# Patient Record
Sex: FEMALE | Race: WHITE | NOT HISPANIC OR LATINO | Employment: FULL TIME | RURAL
[De-identification: names, ages, dates, MRNs, and addresses within clinical notes are randomized per-mention and may not be internally consistent; named-entity substitution may affect disease eponyms.]

---

## 2020-01-22 ENCOUNTER — OFFICE VISIT (OUTPATIENT)
Dept: FAMILY MEDICINE CLINIC | Facility: CLINIC | Age: 55
End: 2020-01-22

## 2020-01-22 VITALS
OXYGEN SATURATION: 98 % | RESPIRATION RATE: 16 BRPM | DIASTOLIC BLOOD PRESSURE: 70 MMHG | WEIGHT: 220.8 LBS | BODY MASS INDEX: 39.12 KG/M2 | TEMPERATURE: 98.4 F | SYSTOLIC BLOOD PRESSURE: 128 MMHG | HEIGHT: 63 IN | HEART RATE: 100 BPM

## 2020-01-22 DIAGNOSIS — Z12.31 ENCOUNTER FOR SCREENING MAMMOGRAM FOR MALIGNANT NEOPLASM OF BREAST: ICD-10-CM

## 2020-01-22 DIAGNOSIS — R53.82 CHRONIC FATIGUE: Primary | ICD-10-CM

## 2020-01-22 DIAGNOSIS — Z13.1 SCREENING FOR DIABETES MELLITUS: ICD-10-CM

## 2020-01-22 DIAGNOSIS — Z13.220 SCREENING, LIPID: ICD-10-CM

## 2020-01-22 DIAGNOSIS — E66.01 MORBID OBESITY (HCC): ICD-10-CM

## 2020-01-22 DIAGNOSIS — Z12.11 SCREEN FOR COLON CANCER: ICD-10-CM

## 2020-01-22 PROCEDURE — 99213 OFFICE O/P EST LOW 20 MIN: CPT | Performed by: FAMILY MEDICINE

## 2020-01-22 RX ORDER — MULTIPLE VITAMINS W/ MINERALS TAB 9MG-400MCG
1 TAB ORAL DAILY
COMMUNITY
End: 2022-04-11

## 2020-01-22 RX ORDER — CHLORAL HYDRATE 500 MG
CAPSULE ORAL
COMMUNITY

## 2020-01-22 RX ORDER — LANOLIN ALCOHOL/MO/W.PET/CERES
1000 CREAM (GRAM) TOPICAL DAILY
COMMUNITY
End: 2020-07-22

## 2020-01-22 RX ORDER — ACETAMINOPHEN,DIPHENHYDRAMINE HCL 500; 25 MG/1; MG/1
1 TABLET, FILM COATED ORAL NIGHTLY PRN
COMMUNITY

## 2020-01-22 NOTE — PROGRESS NOTES
Chief Complaint   Patient presents with   • Fatigue       Subjective   Trish Daley is a 54 y.o. female.     Fatigue   This is a recurrent problem. The current episode started more than 1 year ago. The problem occurs every several days. The problem has been waxing and waning. Associated symptoms include arthralgias and fatigue. Pertinent negatives include no abdominal pain, chest pain, chills, coughing, fever, joint swelling, myalgias, nausea, neck pain, rash, swollen glands, vomiting or weakness. Nothing aggravates the symptoms. Treatments tried: Vit B12, beet juice. The treatment provided no relief.     She denies GISELLE or recent change in job that would worsen her symptoms.     I have reviewed and updated her medications, medical history and problem list during today's office visit.       Past Medical History :  Active Ambulatory Problems     Diagnosis Date Noted   • No Active Ambulatory Problems     Resolved Ambulatory Problems     Diagnosis Date Noted   • No Resolved Ambulatory Problems     Past Medical History:   Diagnosis Date   • Asthma        Medication List:    Current Outpatient Medications:   •  albuterol sulfate  (90 Base) MCG/ACT inhaler, Inhale 2 puffs., Disp: , Rfl:   •  diphenhydrAMINE-acetaminophen (TYLENOL PM)  MG tablet per tablet, Take 1 tablet by mouth At Night As Needed for Sleep., Disp: , Rfl:   •  Multiple Vitamins-Minerals (MULTIVITAMIN WITH MINERALS) tablet tablet, Take 1 tablet by mouth Daily., Disp: , Rfl:   •  Omega-3 Fatty Acids (FISH OIL) 1000 MG capsule capsule, Take  by mouth Daily With Breakfast., Disp: , Rfl:   •  vitamin B-12 (CYANOCOBALAMIN) 1000 MCG tablet, Take 1,000 mcg by mouth Daily., Disp: , Rfl:   •  Vitamin D, Cholecalciferol, (CHOLECALCIFEROL) 10 MCG (400 UNIT) tablet, Take 400 Units by mouth Daily., Disp: , Rfl:       Social History     Tobacco Use   • Smoking status: Never Smoker   • Smokeless tobacco: Never Used   Substance Use Topics   • Alcohol use:  Yes     Frequency: 2-4 times a month     Family History   Problem Relation Age of Onset   • Cancer Father    • Other Father    • Hypertension Father    • COPD Father    • Other Sister    • Hypertension Daughter      Past Surgical History:   Procedure Laterality Date   • FRACTURE SURGERY  2004    left wrist   • HERNIA REPAIR     • HYSTERECTOMY  2001         Review of Systems   Constitutional: Positive for fatigue and unexpected weight gain. Negative for activity change, appetite change, chills, fever and unexpected weight loss.   HENT: Negative for swollen glands and trouble swallowing.    Eyes: Negative for blurred vision, double vision, redness and visual disturbance.   Respiratory: Negative for apnea, cough, shortness of breath and wheezing.    Cardiovascular: Negative for chest pain, palpitations and leg swelling.   Gastrointestinal: Negative for abdominal pain, blood in stool, constipation, diarrhea, nausea and vomiting.   Endocrine: Negative for cold intolerance, heat intolerance, polydipsia and polyuria.   Genitourinary: Positive for amenorrhea. Negative for dysuria and pelvic pain.   Musculoskeletal: Positive for arthralgias. Negative for gait problem, joint swelling, myalgias, neck pain and neck stiffness.   Skin: Negative for rash and skin lesions.   Allergic/Immunologic: Negative for immunocompromised state.   Neurological: Negative for dizziness, tremors, seizures, syncope, facial asymmetry, weakness and headache.   Hematological: Negative for adenopathy. Does not bruise/bleed easily.   Psychiatric/Behavioral: Negative for sleep disturbance, depressed mood and stress.       I have reviewed and confirmed the accuracy of the ROS as documented by the MA/LPN/RN Eleanor Rocha MD      Objective   Vitals:    01/22/20 1516   BP: 128/70   BP Location: Right arm   Patient Position: Sitting   Cuff Size: Large Adult   Pulse: 100   Resp: 16   Temp: 98.4 °F (36.9 °C)   TempSrc: Oral   SpO2: 98%   Weight:  "100 kg (220 lb 12.8 oz)   Height: 160 cm (63\")     Body mass index is 39.11 kg/m².    Physical Exam   Constitutional: She is oriented to person, place, and time. She appears well-developed and well-nourished. No distress.   HENT:   Head: Normocephalic and atraumatic.   Right Ear: External ear normal. Tympanic membrane is not erythematous.   Left Ear: External ear normal. Tympanic membrane is not erythematous.   Mouth/Throat: Oropharynx is clear and moist. No oropharyngeal exudate.   Eyes: Pupils are equal, round, and reactive to light. Conjunctivae and EOM are normal. Right eye exhibits no discharge. Left eye exhibits no discharge. No scleral icterus.   Neck: Normal range of motion. Neck supple. No JVD present. No edema present. No thyromegaly present.   Cardiovascular: Normal rate, regular rhythm and normal heart sounds.   No murmur heard.  Pulmonary/Chest: Effort normal and breath sounds normal. She has no wheezes. She has no rales.   Abdominal: Soft. There is no tenderness. There is no rebound and no guarding.   Musculoskeletal: She exhibits no edema or tenderness.   Lymphadenopathy:     She has no cervical adenopathy.   Neurological: She is alert and oriented to person, place, and time. No cranial nerve deficit. She exhibits normal muscle tone. Coordination normal.   Skin: Skin is warm. Capillary refill takes less than 2 seconds. No rash noted.   Psychiatric: She has a normal mood and affect. Her behavior is normal. Judgment and thought content normal.           Assessment/Plan     Diagnoses and all orders for this visit:    1. Chronic fatigue (Primary)  -     Comprehensive Metabolic Panel  -     TSH  -     CBC & Differential  -     Vitamin D 25 hydroxy  -     Vitamin B12    2. Encounter for screening mammogram for malignant neoplasm of breast  -     Mammo Screening Digital Tomosynthesis Bilateral With CAD    3. Screen for colon cancer    4. Screening for diabetes mellitus  -     Hemoglobin A1c    5. Screening, " lipid  -     Lipid Panel    Check labs, update preventative screening.  She declines flu shot.   Monitor for signs/symptoms of GISELLE.  F/U PRN.  I recommended return to the office at her convenience for a physical exam with pelvic exam (pap smear no longer needed).    No follow-ups on file.

## 2020-01-28 ENCOUNTER — HOSPITAL ENCOUNTER (OUTPATIENT)
Dept: MAMMOGRAPHY | Facility: HOSPITAL | Age: 55
Discharge: HOME | End: 2020-01-28
Admitting: FAMILY MEDICINE

## 2020-01-28 PROCEDURE — 77063 BREAST TOMOSYNTHESIS BI: CPT

## 2020-01-28 PROCEDURE — 77067 SCR MAMMO BI INCL CAD: CPT

## 2020-02-04 DIAGNOSIS — R92.8 ABNORMALITY OF RIGHT BREAST ON SCREENING MAMMOGRAM: Primary | ICD-10-CM

## 2020-02-05 ENCOUNTER — TELEPHONE (OUTPATIENT)
Dept: FAMILY MEDICINE CLINIC | Facility: CLINIC | Age: 55
End: 2020-02-05

## 2020-02-05 NOTE — TELEPHONE ENCOUNTER
Called Trish Daley regarding results pt. verbalized understanding for results.        appt made at [facility] for Feb 13 at 3pm patient told 230

## 2020-02-05 NOTE — TELEPHONE ENCOUNTER
----- Message from Eleanor Rocha MD sent at 2/4/2020  3:49 PM EST -----  Please let patient know that her mammogram results are back and there is a spot in the right breast that needs some extra pictures taken.  We will do this with more mammogram images and possibly an ultrasound.  I have ordered the test for [facility].  If she has any other previous mammograms, we may need to try help radiology track the images down.  Where has she had previous mammograms done?

## 2020-02-13 ENCOUNTER — HOSPITAL ENCOUNTER (OUTPATIENT)
Dept: ULTRASOUND IMAGING | Facility: HOSPITAL | Age: 55
Discharge: HOME | End: 2020-02-13

## 2020-02-13 ENCOUNTER — HOSPITAL ENCOUNTER (OUTPATIENT)
Dept: MAMMOGRAPHY | Facility: HOSPITAL | Age: 55
Discharge: HOME | End: 2020-02-13
Admitting: FAMILY MEDICINE

## 2020-02-13 DIAGNOSIS — R92.8 ABNORMALITY OF RIGHT BREAST ON SCREENING MAMMOGRAM: ICD-10-CM

## 2020-02-13 PROCEDURE — G0279 TOMOSYNTHESIS, MAMMO: HCPCS

## 2020-02-13 PROCEDURE — 77065 DX MAMMO INCL CAD UNI: CPT

## 2020-02-13 PROCEDURE — 76642 ULTRASOUND BREAST LIMITED: CPT

## 2020-02-24 ENCOUNTER — HOSPITAL ENCOUNTER (OUTPATIENT)
Dept: ULTRASOUND IMAGING | Facility: HOSPITAL | Age: 55
Discharge: HOME | End: 2020-02-24
Admitting: FAMILY MEDICINE

## 2020-02-24 DIAGNOSIS — N63.10 BREAST MASS, RIGHT: ICD-10-CM

## 2020-02-26 ENCOUNTER — TELEPHONE (OUTPATIENT)
Dept: FAMILY MEDICINE CLINIC | Facility: CLINIC | Age: 55
End: 2020-02-26

## 2020-03-02 LAB
25(OH)D3+25(OH)D2 SERPL-MCNC: 32.4 NG/ML (ref 30–100)
ALBUMIN SERPL-MCNC: 4.2 G/DL (ref 3.8–4.9)
ALBUMIN/GLOB SERPL: 1.8 {RATIO} (ref 1.2–2.2)
ALP SERPL-CCNC: 67 IU/L (ref 39–117)
ALT SERPL-CCNC: 13 IU/L (ref 0–32)
AST SERPL-CCNC: 14 IU/L (ref 0–40)
BASOPHILS # BLD AUTO: 0.1 X10E3/UL (ref 0–0.2)
BASOPHILS NFR BLD AUTO: 1 %
BILIRUB SERPL-MCNC: 0.2 MG/DL (ref 0–1.2)
BUN SERPL-MCNC: 11 MG/DL (ref 6–24)
BUN/CREAT SERPL: 17 (ref 9–23)
CALCIUM SERPL-MCNC: 9.5 MG/DL (ref 8.7–10.2)
CHLORIDE SERPL-SCNC: 100 MMOL/L (ref 96–106)
CHOLEST SERPL-MCNC: 211 MG/DL (ref 100–199)
CO2 SERPL-SCNC: 26 MMOL/L (ref 20–29)
CREAT SERPL-MCNC: 0.63 MG/DL (ref 0.57–1)
EOSINOPHIL # BLD AUTO: 0.1 X10E3/UL (ref 0–0.4)
EOSINOPHIL NFR BLD AUTO: 1 %
ERYTHROCYTE [DISTWIDTH] IN BLOOD BY AUTOMATED COUNT: 13.1 % (ref 11.7–15.4)
GLOBULIN SER CALC-MCNC: 2.4 G/DL (ref 1.5–4.5)
GLUCOSE SERPL-MCNC: 91 MG/DL (ref 65–99)
HBA1C MFR BLD: 5.7 % (ref 4.8–5.6)
HCT VFR BLD AUTO: 41.4 % (ref 34–46.6)
HDLC SERPL-MCNC: 60 MG/DL
HGB BLD-MCNC: 13.7 G/DL (ref 11.1–15.9)
IMM GRANULOCYTES # BLD AUTO: 0 X10E3/UL (ref 0–0.1)
IMM GRANULOCYTES NFR BLD AUTO: 0 %
LDLC SERPL CALC-MCNC: 94 MG/DL (ref 0–99)
LYMPHOCYTES # BLD AUTO: 2.6 X10E3/UL (ref 0.7–3.1)
LYMPHOCYTES NFR BLD AUTO: 30 %
MCH RBC QN AUTO: 29.5 PG (ref 26.6–33)
MCHC RBC AUTO-ENTMCNC: 33.1 G/DL (ref 31.5–35.7)
MCV RBC AUTO: 89 FL (ref 79–97)
MONOCYTES # BLD AUTO: 0.5 X10E3/UL (ref 0.1–0.9)
MONOCYTES NFR BLD AUTO: 5 %
NEUTROPHILS # BLD AUTO: 5.4 X10E3/UL (ref 1.4–7)
NEUTROPHILS NFR BLD AUTO: 63 %
PLATELET # BLD AUTO: 352 X10E3/UL (ref 150–450)
POTASSIUM SERPL-SCNC: 4.7 MMOL/L (ref 3.5–5.2)
PROT SERPL-MCNC: 6.6 G/DL (ref 6–8.5)
RBC # BLD AUTO: 4.64 X10E6/UL (ref 3.77–5.28)
SODIUM SERPL-SCNC: 141 MMOL/L (ref 134–144)
TRIGL SERPL-MCNC: 285 MG/DL (ref 0–149)
TSH SERPL DL<=0.005 MIU/L-ACNC: 2.19 UIU/ML (ref 0.45–4.5)
VIT B12 SERPL-MCNC: 837 PG/ML (ref 232–1245)
VLDLC SERPL CALC-MCNC: 57 MG/DL (ref 5–40)
WBC # BLD AUTO: 8.7 X10E3/UL (ref 3.4–10.8)

## 2020-03-11 NOTE — PATIENT INSTRUCTIONS
Prediabetes Eating Plan  Prediabetes is a condition that causes blood sugar (glucose) levels to be higher than normal. This increases the risk for developing diabetes. In order to prevent diabetes from developing, your health care provider may recommend a diet and other lifestyle changes to help you:  · Control your blood glucose levels.  · Improve your cholesterol levels.  · Manage your blood pressure.  Your health care provider may recommend working with a diet and nutrition specialist (dietitian) to make a meal plan that is best for you.  What are tips for following this plan?  Lifestyle  · Set weight loss goals with the help of your health care team. It is recommended that most people with prediabetes lose 7% of their current body weight.  · Exercise for at least 30 minutes at least 5 days a week.  · Attend a support group or seek ongoing support from a mental health counselor.  · Take over-the-counter and prescription medicines only as told by your health care provider.  Reading food labels  · Read food labels to check the amount of fat, salt (sodium), and sugar in prepackaged foods. Avoid foods that have:  ? Saturated fats.  ? Trans fats.  ? Added sugars.  · Avoid foods that have more than 300 milligrams (mg) of sodium per serving. Limit your daily sodium intake to less than 2,300 mg each day.  Shopping  · Avoid buying pre-made and processed foods.  Cooking  · Cook with olive oil. Do not use butter, lard, or ghee.  · Bake, broil, grill, or boil foods. Avoid frying.  Meal planning    · Work with your dietitian to develop an eating plan that is right for you. This may include:  ? Tracking how many calories you take in. Use a food diary, notebook, or mobile application to track what you eat at each meal.  ? Using the glycemic index (GI) to plan your meals. The index tells you how quickly a food will raise your blood glucose. Choose low-GI foods. These foods take a longer time to raise blood glucose.  · Consider  following a Mediterranean diet. This diet includes:  ? Several servings each day of fresh fruits and vegetables.  ? Eating fish at least twice a week.  ? Several servings each day of whole grains, beans, nuts, and seeds.  ? Using olive oil instead of other fats.  ? Moderate alcohol consumption.  ? Eating small amounts of red meat and whole-fat dairy.  · If you have high blood pressure, you may need to limit your sodium intake or follow a diet such as the DASH eating plan. DASH is an eating plan that aims to lower high blood pressure.  What foods are recommended?  The items listed below may not be a complete list. Talk with your dietitian about what dietary choices are best for you.  Grains  Whole grains, such as whole-wheat or whole-grain breads, crackers, cereals, and pasta. Unsweetened oatmeal. Bulgur. Barley. Quinoa. Brown rice. Corn or whole-wheat flour tortillas or taco shells.  Vegetables  Lettuce. Spinach. Peas. Beets. Cauliflower. Cabbage. Broccoli. Carrots. Tomatoes. Squash. Eggplant. Herbs. Peppers. Onions. Cucumbers. [place] sprouts.  Fruits  Berries. Bananas. Apples. Oranges. Grapes. Papaya. [place]. Pomegranate. Kiwi. Grapefruit. Cherries.  Meats and other protein foods  Seafood. Poultry without skin. Lean cuts of pork and beef. Tofu. Eggs. Nuts. Beans.  Dairy  Low-fat or fat-free dairy products, such as yogurt, cottage cheese, and cheese.  Beverages  Water. Tea. Coffee. Sugar-free or diet soda. [place] water. Lowfat or no-fat milk. Milk alternatives, such as soy or almond milk.  Fats and oils  Olive oil. Canola oil. Sunflower oil. Grapeseed oil. Avocado. Walnuts.  Sweets and desserts  Sugar-free or low-fat pudding. Sugar-free or low-fat ice cream and other frozen treats.  Seasoning and other foods  Herbs. Sodium-free spices. Mustard. Relish. Low-fat, low-sugar ketchup. Low-fat, low-sugar barbecue sauce. Low-fat or fat-free mayonnaise.  What foods are not recommended?  The items listed below may not be a  complete list. Talk with your dietitian about what dietary choices are best for you.  Grains  Refined white flour and flour products, such as bread, pasta, snack foods, and cereals.  Vegetables  Canned vegetables. Frozen vegetables with butter or cream sauce.  Fruits  Fruits canned with syrup.  Meats and other protein foods  Fatty cuts of meat. Poultry with skin. Breaded or fried meat. Processed meats.  Dairy  Full-fat yogurt, cheese, or milk.  Beverages  Sweetened drinks, such as sweet iced tea and soda.  Fats and oils  Butter. Lard. Ghee.  Sweets and desserts  Baked goods, such as cake, cupcakes, pastries, cookies, and cheesecake.  Seasoning and other foods  Spice mixes with added salt. Ketchup. Barbecue sauce. Mayonnaise.  Summary  · To prevent diabetes from developing, you may need to make diet and other lifestyle changes to help control blood sugar, improve cholesterol levels, and manage your blood pressure.  · Set weight loss goals with the help of your health care team. It is recommended that most people with prediabetes lose 7 percent of their current body weight.  · Consider following a Mediterranean diet that includes plenty of fresh fruits and vegetables, whole grains, beans, nuts, seeds, fish, lean meat, low-fat dairy, and healthy oils.  This information is not intended to replace advice given to you by your health care provider. Make sure you discuss any questions you have with your health care provider.  Document Released: 05/03/2016 Document Revised: 02/21/2018 Document Reviewed: 02/21/2018  [employer] Patient Education © 2020 [employer]

## 2020-07-22 ENCOUNTER — TELEPHONE (OUTPATIENT)
Dept: FAMILY MEDICINE CLINIC | Facility: CLINIC | Age: 55
End: 2020-07-22

## 2020-07-22 ENCOUNTER — OFFICE VISIT (OUTPATIENT)
Dept: FAMILY MEDICINE CLINIC | Facility: CLINIC | Age: 55
End: 2020-07-22

## 2020-07-22 VITALS
DIASTOLIC BLOOD PRESSURE: 64 MMHG | WEIGHT: 211.2 LBS | OXYGEN SATURATION: 96 % | RESPIRATION RATE: 18 BRPM | SYSTOLIC BLOOD PRESSURE: 128 MMHG | HEIGHT: 63 IN | TEMPERATURE: 98.9 F | BODY MASS INDEX: 37.42 KG/M2 | HEART RATE: 104 BPM

## 2020-07-22 DIAGNOSIS — L23.7 ALLERGIC CONTACT DERMATITIS DUE TO PLANTS, EXCEPT FOOD: Primary | ICD-10-CM

## 2020-07-22 PROCEDURE — 99213 OFFICE O/P EST LOW 20 MIN: CPT | Performed by: FAMILY MEDICINE

## 2020-07-22 PROCEDURE — 96372 THER/PROPH/DIAG INJ SC/IM: CPT | Performed by: FAMILY MEDICINE

## 2020-07-22 RX ORDER — METHYLPREDNISOLONE ACETATE 80 MG/ML
80 INJECTION, SUSPENSION INTRA-ARTICULAR; INTRALESIONAL; INTRAMUSCULAR; SOFT TISSUE ONCE
Status: COMPLETED | OUTPATIENT
Start: 2020-07-22 | End: 2020-07-22

## 2020-07-22 RX ORDER — DEXAMETHASONE SODIUM PHOSPHATE 10 MG/ML
10 INJECTION INTRAMUSCULAR; INTRAVENOUS ONCE
Status: COMPLETED | OUTPATIENT
Start: 2020-07-22 | End: 2020-07-22

## 2020-07-22 RX ORDER — METHYLPREDNISOLONE 4 MG/1
TABLET ORAL
Qty: 21 TABLET | Refills: 0 | Status: SHIPPED | OUTPATIENT
Start: 2020-07-22 | End: 2022-04-11

## 2020-07-22 RX ADMIN — DEXAMETHASONE SODIUM PHOSPHATE 10 MG: 10 INJECTION INTRAMUSCULAR; INTRAVENOUS at 17:42

## 2020-07-22 RX ADMIN — METHYLPREDNISOLONE ACETATE 80 MG: 80 INJECTION, SUSPENSION INTRA-ARTICULAR; INTRALESIONAL; INTRAMUSCULAR; SOFT TISSUE at 17:44

## 2020-07-22 NOTE — PROGRESS NOTES
Chief Complaint   Patient presents with   • Rash       Subjective   Trish Daley is a 55 y.o. female.     Rash   This is a new problem. The current episode started yesterday. The problem has been gradually worsening since onset. The affected locations include the face, neck, left ear, left arm, right arm and chest. The rash is characterized by blistering, redness, pain, burning and itchiness. She was exposed to plant contact. Pertinent negatives include no cough, diarrhea, eye pain, fatigue, fever, shortness of breath, sore throat or vomiting. Past treatments include antihistamine and topical steroids. The treatment provided mild relief. Her past medical history is significant for asthma.      I have reviewed and updated her medications, medical history and problem list during today's office visit.       Past Medical History :  Active Ambulatory Problems     Diagnosis Date Noted   • No Active Ambulatory Problems     Resolved Ambulatory Problems     Diagnosis Date Noted   • No Resolved Ambulatory Problems     Past Medical History:   Diagnosis Date   • Asthma        Medication List:    Current Outpatient Medications:   •  albuterol sulfate  (90 Base) MCG/ACT inhaler, Inhale 2 puffs., Disp: , Rfl:   •  diphenhydrAMINE-acetaminophen (TYLENOL PM)  MG tablet per tablet, Take 1 tablet by mouth At Night As Needed for Sleep., Disp: , Rfl:   •  Multiple Vitamins-Minerals (MULTIVITAMIN WITH MINERALS) tablet tablet, Take 1 tablet by mouth Daily., Disp: , Rfl:   •  Omega-3 Fatty Acids (FISH OIL) 1000 MG capsule capsule, Take  by mouth Daily With Breakfast., Disp: , Rfl:   •  Vitamin D, Cholecalciferol, (CHOLECALCIFEROL) 10 MCG (400 UNIT) tablet, Take 400 Units by mouth Daily., Disp: , Rfl:       Allergies   Allergen Reactions   • Cantaloupe (Diagnostic) Other (See Comments)     Ear inflamation   • Codeine Arrhythmia   • Cucumber Extract Other (See Comments)     Cucumbers earache   • Oxycodone-Acetaminophen  "Arrhythmia   • Pepper Other (See Comments)     Bell peppers/inflamation of ears and bowel system   • Watermelon [Citrullus Vulgaris] Other (See Comments)     earache       Social History     Tobacco Use   • Smoking status: Never Smoker   • Smokeless tobacco: Never Used   Substance Use Topics   • Alcohol use: Yes     Frequency: 2-4 times a month       Review of Systems   Constitutional: Negative for appetite change, chills, fatigue and fever.   HENT: Negative for sore throat.    Eyes: Negative for pain, itching and visual disturbance.   Respiratory: Negative for cough, shortness of breath and wheezing.    Cardiovascular: Negative for chest pain, palpitations and leg swelling.   Gastrointestinal: Negative for abdominal pain, diarrhea and vomiting.   Endocrine: Negative for polydipsia and polyuria.   Genitourinary: Negative for decreased urine volume.   Musculoskeletal: Negative for myalgias.   Skin: Positive for rash. Negative for bruise.   Neurological: Negative for dizziness and syncope.   Hematological: Negative for adenopathy. Does not bruise/bleed easily.       I have reviewed and confirmed the accuracy of the ROS as documented by the MA/LPN/RN Eleanor Rocha MD      Objective   Vitals:    07/22/20 1713   BP: 128/64   BP Location: Right arm   Patient Position: Sitting   Cuff Size: Large Adult   Pulse: 104   Resp: 18   Temp: 98.9 °F (37.2 °C)   TempSrc: Oral   SpO2: 96%   Weight: 95.8 kg (211 lb 3.2 oz)   Height: 160 cm (63\")     Body mass index is 37.41 kg/m².    Physical Exam   Constitutional: She is oriented to person, place, and time. She appears well-developed and well-nourished. No distress.   HENT:   Head: Normocephalic and atraumatic.   Mouth/Throat: Oropharynx is clear and moist.   Eyes: Pupils are equal, round, and reactive to light. Conjunctivae and EOM are normal.   Neck: Normal range of motion. Neck supple. No edema present.   Cardiovascular: Normal rate, regular rhythm and normal heart " sounds.   Pulmonary/Chest: Effort normal and breath sounds normal.   Abdominal: Soft.   Musculoskeletal: She exhibits no edema.   Neurological: She is alert and oriented to person, place, and time. No cranial nerve deficit.   Skin: Skin is warm. Capillary refill takes less than 2 seconds. Rash noted. Rash is macular and papular. There is erythema.   Psychiatric: She has a normal mood and affect. Her behavior is normal. Thought content normal.         Assessment/Plan     Diagnoses and all orders for this visit:    1. Allergic contact dermatitis due to plants, except food (Primary)  -     methylPREDNISolone (MEDROL, JENNIFER,) 4 MG tablet; Take as directed on package instructions.  Dispense: 21 tablet; Refill: 0  -     methylPREDNISolone acetate (DEPO-medrol) injection 80 mg  -     dexamethasone (DECADRON) injection 10 mg    IM steroids in office.  Can start oral steroid pack if rash starts to rebound.    Return if symptoms worsen or fail to improve.     I wore protective equipment throughout this patient encounter to include mask. Hand hygiene was performed before donning protective equipment and after removal when leaving the room.

## 2020-07-22 NOTE — TELEPHONE ENCOUNTER
She has poison ivy and benadryl is not helping. Can you send in something to [facility] on [address] in [place]?

## 2020-09-09 ENCOUNTER — TELEPHONE (OUTPATIENT)
Dept: FAMILY MEDICINE CLINIC | Facility: CLINIC | Age: 55
End: 2020-09-09

## 2020-09-09 NOTE — TELEPHONE ENCOUNTER
----- Message from Eleanor Rocha MD sent at 8/3/2020  4:19 PM EDT -----  Regarding: FW: Repeat mammogram  Will you see if she is ready to set these up?  If so, she will need a diagnostic right  mammogram and right breast ultrasound limited (if needed).  Thanks.    ----- Message -----  From: Eleanor Rocha MD  Sent: 8/3/2020  To: Eleanor Rocha MD  Subject: Repeat mammogram                                 Repeat right breast diagnostic mammogram

## 2021-06-28 ENCOUNTER — TELEPHONE (OUTPATIENT)
Dept: FAMILY MEDICINE CLINIC | Facility: CLINIC | Age: 56
End: 2021-06-28

## 2021-06-28 NOTE — TELEPHONE ENCOUNTER
Caller: Trish Daley    Relationship: Self    Best call back number: [phone]    What medication are you requesting: Z-PAC    What are your current symptoms: RUNNING NOSE, STUFFY NOSE, SORE THROAT, COUGHING    How long have you been experiencing symptoms: WEEK AND A HALF    Have you had these symptoms before:    [x] Yes  [] No    Have you been treated for these symptoms before:   [x] Yes  [] No    If a prescription is needed, what is your preferred pharmacy and phone number:      [id number] - [address] [facility] [address] [facility] [address] [facility] [address] - [phone] [facility] [phone]      Additional notes:

## 2021-06-28 NOTE — TELEPHONE ENCOUNTER
Will need appt.    We have no openings left on our schedule today, so either Wed or other provider.

## 2022-04-11 ENCOUNTER — HOSPITAL ENCOUNTER (OUTPATIENT)
Dept: GENERAL RADIOLOGY | Facility: HOSPITAL | Age: 57
Discharge: HOME | End: 2022-04-11
Admitting: FAMILY MEDICINE

## 2022-04-11 ENCOUNTER — OFFICE VISIT (OUTPATIENT)
Dept: FAMILY MEDICINE CLINIC | Facility: CLINIC | Age: 57
End: 2022-04-11

## 2022-04-11 VITALS
BODY MASS INDEX: 38.14 KG/M2 | HEART RATE: 86 BPM | WEIGHT: 215.25 LBS | HEIGHT: 63 IN | TEMPERATURE: 98.2 F | SYSTOLIC BLOOD PRESSURE: 126 MMHG | RESPIRATION RATE: 18 BRPM | DIASTOLIC BLOOD PRESSURE: 78 MMHG | OXYGEN SATURATION: 99 %

## 2022-04-11 DIAGNOSIS — Z12.11 COLON CANCER SCREENING: ICD-10-CM

## 2022-04-11 DIAGNOSIS — Z13.1 SCREENING FOR DIABETES MELLITUS: ICD-10-CM

## 2022-04-11 DIAGNOSIS — E66.9 OBESITY, CLASS II, BMI 35-39.9: ICD-10-CM

## 2022-04-11 DIAGNOSIS — R92.8 ABNORMALITY OF RIGHT BREAST ON SCREENING MAMMOGRAM: ICD-10-CM

## 2022-04-11 DIAGNOSIS — M25.461 KNEE EFFUSION, RIGHT: ICD-10-CM

## 2022-04-11 DIAGNOSIS — M25.561 ACUTE PAIN OF RIGHT KNEE: Primary | ICD-10-CM

## 2022-04-11 DIAGNOSIS — Z13.220 SCREENING, LIPID: ICD-10-CM

## 2022-04-11 DIAGNOSIS — Z13.0 SCREENING, IRON DEFICIENCY ANEMIA: ICD-10-CM

## 2022-04-11 PROCEDURE — 99214 OFFICE O/P EST MOD 30 MIN: CPT | Performed by: FAMILY MEDICINE

## 2022-04-11 PROCEDURE — 73564 X-RAY EXAM KNEE 4 OR MORE: CPT

## 2022-04-11 RX ORDER — PREDNISONE 20 MG/1
TABLET ORAL
Qty: 11 TABLET | Refills: 0 | Status: SHIPPED | OUTPATIENT
Start: 2022-04-11 | End: 2022-11-04

## 2022-04-11 NOTE — PROGRESS NOTES
Chief Complaint   Patient presents with   • Leg Pain       Subjective   Trish Daley is a 56 y.o. female.     Leg Pain   The incident occurred more than 1 week ago. There was no injury mechanism. The pain is present in the right leg and right foot. The pain is at a severity of 6/10. The pain is moderate. The pain has been fluctuating since onset. Associated symptoms include numbness (when knee swells) and tingling. Pertinent negatives include no inability to bear weight. She reports no foreign bodies present. Exacerbated by: bending - has been driving a lot and the seat hitting the back of the knee worsens symptoms. She has tried rest, non-weight bearing, immobilization, elevation, heat, ice, acetaminophen and NSAIDs (Ibuprofen) for the symptoms. The treatment provided mild relief.      Previous knee imaging (2015) showed mild degenerative changes.    I have reviewed relevant past medical, family, social and surgical history for this patient.  Medications review is done by myself, with patient.  Last visit in the office 7/2020.      Past Medical History :  Active Ambulatory Problems     Diagnosis Date Noted   • No Active Ambulatory Problems     Resolved Ambulatory Problems     Diagnosis Date Noted   • No Resolved Ambulatory Problems     Past Medical History:   Diagnosis Date   • Asthma        Medication List:    Current Outpatient Medications:   •  albuterol sulfate  (90 Base) MCG/ACT inhaler, Inhale 2 puffs., Disp: , Rfl:   •  diphenhydrAMINE-acetaminophen (TYLENOL PM)  MG tablet per tablet, Take 1 tablet by mouth At Night As Needed for Sleep., Disp: , Rfl:   •  Omega-3 Fatty Acids (FISH OIL) 1000 MG capsule capsule, Take  by mouth Daily With Breakfast., Disp: , Rfl:   •  Vitamin D, Cholecalciferol, (CHOLECALCIFEROL) 10 MCG (400 UNIT) tablet, Take 400 Units by mouth Daily., Disp: , Rfl:       Allergies   Allergen Reactions   • Cantaloupe (Diagnostic) Other (See Comments)     Ear inflamation   •  "Codeine Arrhythmia   • Cucumber Extract Other (See Comments)     Cucumbers earache   • Oxycodone-Acetaminophen Arrhythmia   • Pepper Other (See Comments)     Bell peppers/inflamation of ears and bowel system   • Watermelon [Citrullus Vulgaris] Other (See Comments)     earache       Social History     Tobacco Use   • Smoking status: Never Smoker   • Smokeless tobacco: Never Used   Substance Use Topics   • Alcohol use: Yes       Review of Systems   Constitutional: Negative for fever.   Respiratory: Negative for shortness of breath.    Cardiovascular: Negative for leg swelling (swelling limited to knee).   Musculoskeletal: Positive for gait problem and joint swelling.   Skin: Negative for color change and bruise.   Neurological: Positive for tingling and numbness (when knee swells). Negative for weakness.         Objective   Vitals:    04/11/22 1536   BP: 126/78   BP Location: Right arm   Patient Position: Sitting   Cuff Size: Adult   Pulse: 86   Resp: 18   Temp: 98.2 °F (36.8 °C)   TempSrc: Temporal   SpO2: 99%   Weight: 97.6 kg (215 lb 4 oz)   Height: 160 cm (63\")     Body mass index is 38.13 kg/m².    Physical Exam  Constitutional:       General: She is not in acute distress.     Appearance: Normal appearance. She is well-developed. She is obese.   HENT:      Head: Normocephalic and atraumatic.   Eyes:      General: No scleral icterus.        Right eye: No discharge.         Left eye: No discharge.      Extraocular Movements: Extraocular movements intact.      Conjunctiva/sclera: Conjunctivae normal.   Cardiovascular:      Rate and Rhythm: Normal rate and regular rhythm.      Heart sounds: Normal heart sounds. No murmur heard.  Pulmonary:      Effort: Pulmonary effort is normal.      Breath sounds: Normal breath sounds.   Musculoskeletal:         General: No deformity.      Cervical back: Normal range of motion and neck supple.      Right knee: Swelling, effusion, bony tenderness (medial tibia) and crepitus present. " Tenderness present over the medial joint line. No ACL laxity or PCL laxity.      Instability Tests: Anterior drawer test negative. Posterior drawer test negative.      Right lower leg: Bony tenderness present. No swelling, lacerations or tenderness. No edema.      Left lower leg: No edema.        Legs:    Skin:     General: Skin is warm.      Capillary Refill: Capillary refill takes less than 2 seconds.      Findings: No rash.   Neurological:      General: No focal deficit present.      Mental Status: She is alert.   Psychiatric:         Mood and Affect: Mood normal.         Behavior: Behavior normal.         Thought Content: Thought content normal.           Assessment/Plan     Diagnoses and all orders for this visit:    1. Acute pain of right knee (Primary)  -     XR Knee Bilateral AP Standing  -     XR Knee 4+ View Right  -     predniSONE (DELTASONE) 20 MG tablet; TID x 3 days, BID x 3 days, QD x 3 days, 1/2 tab daily x 4 days  Dispense: 11 tablet; Refill: 0    2. Knee effusion, right  -     XR Knee Bilateral AP Standing  -     XR Knee 4+ View Right  -     predniSONE (DELTASONE) 20 MG tablet; TID x 3 days, BID x 3 days, QD x 3 days, 1/2 tab daily x 4 days  Dispense: 11 tablet; Refill: 0    3. Colon cancer screening  -     Ambulatory Referral For Screening Colonoscopy    4. Abnormality of right breast on screening mammogram  -     Mammo Diagnostic Digital Tomosynthesis Bilateral With CAD  -     US Breast Right Limited    5. Screening, lipid  -     Lipid Panel    6. Screening for diabetes mellitus  -     Hemoglobin A1c  -     Comprehensive Metabolic Panel    7. Screening, iron deficiency anemia  -     CBC & Differential    8. Obesity, Class II, BMI 35-39.9    Short course of oral steroids.  Continue ibuprofen, ice.  Recommended steroid joint injection - she can consider and return for injection if interested.    Green packet given.  Mammogram (to f/u on previously abnormal mammogram right breast) and u/s ordered.   Fasting labs ordered.      Return if symptoms worsen or fail to improve.       Patient was given instructions and counseling regarding his/her condition or for health maintenance advice. Please see specific information pulled into the AVS if appropriate.     I wore protective equipment throughout this patient encounter to include mask. Hand hygiene was performed before donning protective equipment and after removal when leaving the room.

## 2022-04-14 ENCOUNTER — TELEPHONE (OUTPATIENT)
Dept: FAMILY MEDICINE CLINIC | Facility: CLINIC | Age: 57
End: 2022-04-14

## 2022-04-14 NOTE — TELEPHONE ENCOUNTER
----- Message from Eleanor Rocha MD sent at 4/14/2022  7:36 AM EDT -----  Please let patient know that her knee xray is showing moderate arthritis in the knee with significant narrowing (degeneration of the cushioning) on the inner side of the knee.  She also has similar changes in the left knee.  Continue with treatment p  kyle as discussed at her visit.  Recommend joint injection if she does not improve, or referral if she desires.

## 2022-04-14 NOTE — PROGRESS NOTES
Please let patient know that her knee xray is showing moderate arthritis in the knee with significant narrowing (degeneration of the cushioning) on the inner side of the knee.  She also has similar changes in the left knee.  Continue with treatment plan as discussed at her visit.  Recommend joint injection if she does not improve, or referral if she desires.

## 2022-04-27 ENCOUNTER — HOSPITAL ENCOUNTER (OUTPATIENT)
Dept: ULTRASOUND IMAGING | Facility: HOSPITAL | Age: 57
Discharge: HOME | End: 2022-04-27

## 2022-04-27 ENCOUNTER — HOSPITAL ENCOUNTER (OUTPATIENT)
Dept: MAMMOGRAPHY | Facility: HOSPITAL | Age: 57
Discharge: HOME | End: 2022-04-27

## 2022-04-27 PROCEDURE — G0279 TOMOSYNTHESIS, MAMMO: HCPCS

## 2022-04-27 PROCEDURE — 77066 DX MAMMO INCL CAD BI: CPT

## 2022-11-04 ENCOUNTER — OFFICE VISIT (OUTPATIENT)
Dept: FAMILY MEDICINE CLINIC | Facility: CLINIC | Age: 57
End: 2022-11-04

## 2022-11-04 VITALS
OXYGEN SATURATION: 95 % | TEMPERATURE: 98 F | SYSTOLIC BLOOD PRESSURE: 126 MMHG | HEIGHT: 63 IN | DIASTOLIC BLOOD PRESSURE: 70 MMHG | BODY MASS INDEX: 37.21 KG/M2 | HEART RATE: 74 BPM | WEIGHT: 210 LBS | RESPIRATION RATE: 18 BRPM

## 2022-11-04 DIAGNOSIS — R79.89 LOW VITAMIN D LEVEL: ICD-10-CM

## 2022-11-04 DIAGNOSIS — R89.9 ABNORMAL LABORATORY TEST: ICD-10-CM

## 2022-11-04 DIAGNOSIS — E66.09 CLASS 2 OBESITY DUE TO EXCESS CALORIES WITHOUT SERIOUS COMORBIDITY WITH BODY MASS INDEX (BMI) OF 37.0 TO 37.9 IN ADULT: Primary | ICD-10-CM

## 2022-11-04 DIAGNOSIS — F41.9 ANXIETY AND DEPRESSION: ICD-10-CM

## 2022-11-04 DIAGNOSIS — Z11.59 ENCOUNTER FOR HEPATITIS C SCREENING TEST FOR LOW RISK PATIENT: ICD-10-CM

## 2022-11-04 DIAGNOSIS — F32.A ANXIETY AND DEPRESSION: ICD-10-CM

## 2022-11-04 DIAGNOSIS — Z12.11 COLON CANCER SCREENING: ICD-10-CM

## 2022-11-04 PROBLEM — E66.812 CLASS 2 OBESITY DUE TO EXCESS CALORIES WITHOUT SERIOUS COMORBIDITY WITH BODY MASS INDEX (BMI) OF 37.0 TO 37.9 IN ADULT: Status: ACTIVE | Noted: 2022-11-04

## 2022-11-04 PROCEDURE — 99214 OFFICE O/P EST MOD 30 MIN: CPT | Performed by: STUDENT IN AN ORGANIZED HEALTH CARE EDUCATION/TRAINING PROGRAM

## 2022-11-04 RX ORDER — HYDROXYZINE HYDROCHLORIDE 10 MG/1
10 TABLET, FILM COATED ORAL 3 TIMES DAILY PRN
Qty: 90 TABLET | Refills: 1 | Status: SHIPPED | OUTPATIENT
Start: 2022-11-04 | End: 2023-02-03

## 2022-11-04 RX ORDER — ESCITALOPRAM OXALATE 5 MG/1
5 TABLET ORAL DAILY
Qty: 60 TABLET | Refills: 0 | Status: SHIPPED | OUTPATIENT
Start: 2022-11-04 | End: 2022-12-09

## 2022-11-04 NOTE — PROGRESS NOTES
"Subjective   Trish Daley is a 57 y.o. female.   Chief Complaint   Patient presents with   • Establish Care     Pt transferring from Dr. Eleanor Rocha   • Stress       History of Present Illness     Anxiety/Depression  -Started 1 year ago.  quit his job and started working at a company pt works for ( is stressful), company is growing so fast it's putting a lot of pressure on her ( is quitting due to pressure too).  takes stress out on wife in the sense he can't let it go and pt feels she can't get away from work. Too much work and not enough people  - denies marital issues  - when going through her first divorce years ago was put on xanax but she felt \"stupid and foggy\"    - pt is more prone to side effects    The following portions of the patient's history were reviewed and updated as appropriate: allergies, current medications, past family history, past medical history, past social history, past surgical history and problem list.    Patient Active Problem List   Diagnosis   • Class 2 obesity due to excess calories without serious comorbidity with body mass index (BMI) of 37.0 to 37.9 in adult       Current Outpatient Medications on File Prior to Visit   Medication Sig Dispense Refill   • diphenhydrAMINE-acetaminophen (TYLENOL PM)  MG tablet per tablet Take 1 tablet by mouth At Night As Needed for Sleep.     • Vitamin D, Cholecalciferol, (CHOLECALCIFEROL) 10 MCG (400 UNIT) tablet Take 400 Units by mouth Daily.     • albuterol sulfate  (90 Base) MCG/ACT inhaler Inhale 2 puffs.     • Omega-3 Fatty Acids (FISH OIL) 1000 MG capsule capsule Take  by mouth Daily With Breakfast.     • [DISCONTINUED] predniSONE (DELTASONE) 20 MG tablet TID x 3 days, BID x 3 days, QD x 3 days, 1/2 tab daily x 4 days 11 tablet 0     No current facility-administered medications on file prior to visit.     Current outpatient and discharge medications have been reconciled for the " "patient.  Reviewed by: Niki Collins DO      Allergies   Allergen Reactions   • Cantaloupe (Diagnostic) Other (See Comments)     Ear inflamation   • Codeine Arrhythmia   • Cucumber Extract Other (See Comments)     Cucumbers earache   • Oxycodone-Acetaminophen Arrhythmia   • Pepper Other (See Comments)     Bell peppers/inflamation of ears and bowel system   • Watermelon [Citrullus Vulgaris] Other (See Comments)     earache         Objective   Visit Vitals  /70 (BP Location: Right arm, Patient Position: Sitting, Cuff Size: Large Adult)   Pulse 74   Temp 98 °F (36.7 °C) (Skin)   Resp 18   Ht 160 cm (63\")   Wt 95.3 kg (210 lb)   SpO2 95%   BMI 37.20 kg/m²       Physical Exam  HENT:      Head: Normocephalic.   Eyes:      Conjunctiva/sclera: Conjunctivae normal.   Cardiovascular:      Rate and Rhythm: Normal rate and regular rhythm.      Heart sounds: Normal heart sounds.   Pulmonary:      Effort: Pulmonary effort is normal. No respiratory distress.      Breath sounds: Normal breath sounds. No wheezing, rhonchi or rales.   Neurological:      Mental Status: She is alert.           Diagnoses and all orders for this visit:    Anxiety and depression  -     escitalopram (LEXAPRO) 5 MG tablet; Take 1 tablet by mouth Daily.  Dispense: 60 tablet;.  Patient will take 1 daily for the first 7 days and then start taking 2 daily afterward  -     hydrOXYzine (ATARAX) 10 MG tablet; Take 1 tablet by mouth 3 (Three) Times a Day As Needed for Itching.  Dispense: 90 tablet; Refill: 1.  Did tell patient that this is for as needed use that she may take half of 1 or up to 5 at a time if she needs to.  Did tell her that it can make people drowsy, which is why I am giving her the lower dose  -Follow-up in 1 month     Class 2 obesity due to excess calories without serious comorbidity with body mass index (BMI) of 37.0 to 37.9 in adult (Primary)  Assessment & Plan:  Patient's (Body mass index is 37.2 kg/m².) indicates that they are obese " (BMI >30) with health conditions that include none . Weight is unchanged. BMI is is above average; BMI management plan is completed. We discussed portion control and increasing exercise.     Orders:  -     Lipid Panel With / Chol / HDL Ratio to monitor  -     Comprehensive Metabolic Panel to check LFTs for possible NAFLD    Encounter for hepatitis C screening test for low risk patient  -     Hepatitis C Antibody     Low vitamin D level  -     Vitamin D,25-Hydroxy monitor as patient takes a supplement    Abnormal laboratory test  -     CBC & Differential     Colon cancer screening  -     Ambulatory Referral For Screening Colonoscopy: Made for [facility]             Follow Up  - 1 month to follow up on anxiety  - 1-2 months for annual physical exam    Expected course, medications, and adverse effects discussed as appropriate.  Call or return if worsening or persistent symptoms.  I wore protective equipment throughout this patient encounter to include mask and eye protection. Hand hygiene was performed before donning protective equipment and after removal when leaving the room.    This document is intended for medical expert use only. Reading of this document by patients and/or patient's family without participating medical staff guidance may result in misinterpretation and unintended morbidity. Any interpretation of such data is the responsibility of the patient and/or family member responsible for the patient in concert with their primary or specialist providers, not to be left for sources of online searches such as [employer] or similar queries. Relying on these approaches to knowledge may result in misinterpretation, misguided goals of care and even death should patients or family members try recommendations outside of the realm of professional medical care.

## 2022-11-04 NOTE — ASSESSMENT & PLAN NOTE
Patient's (Body mass index is 37.2 kg/m².) indicates that they are obese (BMI >30) with health conditions that include none . Weight is unchanged. BMI is is above average; BMI management plan is completed. We discussed portion control and increasing exercise.

## 2022-11-12 LAB
25(OH)D3+25(OH)D2 SERPL-MCNC: 25.2 NG/ML (ref 30–100)
ALBUMIN SERPL-MCNC: 4.6 G/DL (ref 3.8–4.9)
ALBUMIN/GLOB SERPL: 2.3 {RATIO} (ref 1.2–2.2)
ALP SERPL-CCNC: 85 IU/L (ref 44–121)
ALT SERPL-CCNC: 12 IU/L (ref 0–32)
AST SERPL-CCNC: 12 IU/L (ref 0–40)
BASOPHILS # BLD AUTO: 0.1 X10E3/UL (ref 0–0.2)
BASOPHILS NFR BLD AUTO: 1 %
BILIRUB SERPL-MCNC: 0.2 MG/DL (ref 0–1.2)
BUN SERPL-MCNC: 14 MG/DL (ref 6–24)
BUN/CREAT SERPL: 22 (ref 9–23)
CALCIUM SERPL-MCNC: 9.7 MG/DL (ref 8.7–10.2)
CHLORIDE SERPL-SCNC: 103 MMOL/L (ref 96–106)
CHOLEST SERPL-MCNC: 235 MG/DL (ref 100–199)
CHOLEST/HDLC SERPL: 4.1 RATIO (ref 0–4.4)
CO2 SERPL-SCNC: 25 MMOL/L (ref 20–29)
CREAT SERPL-MCNC: 0.64 MG/DL (ref 0.57–1)
EGFRCR SERPLBLD CKD-EPI 2021: 103 ML/MIN/1.73
EOSINOPHIL # BLD AUTO: 0.1 X10E3/UL (ref 0–0.4)
EOSINOPHIL NFR BLD AUTO: 1 %
ERYTHROCYTE [DISTWIDTH] IN BLOOD BY AUTOMATED COUNT: 13.1 % (ref 11.7–15.4)
GLOBULIN SER CALC-MCNC: 2 G/DL (ref 1.5–4.5)
GLUCOSE SERPL-MCNC: 104 MG/DL (ref 70–99)
HCT VFR BLD AUTO: 41 % (ref 34–46.6)
HCV AB S/CO SERPL IA: <0.1 S/CO RATIO (ref 0–0.9)
HDLC SERPL-MCNC: 57 MG/DL
HGB BLD-MCNC: 13.6 G/DL (ref 11.1–15.9)
IMM GRANULOCYTES # BLD AUTO: 0 X10E3/UL (ref 0–0.1)
IMM GRANULOCYTES NFR BLD AUTO: 1 %
LDLC SERPL CALC-MCNC: 147 MG/DL (ref 0–99)
LYMPHOCYTES # BLD AUTO: 2.2 X10E3/UL (ref 0.7–3.1)
LYMPHOCYTES NFR BLD AUTO: 37 %
MCH RBC QN AUTO: 29.2 PG (ref 26.6–33)
MCHC RBC AUTO-ENTMCNC: 33.2 G/DL (ref 31.5–35.7)
MCV RBC AUTO: 88 FL (ref 79–97)
MONOCYTES # BLD AUTO: 0.3 X10E3/UL (ref 0.1–0.9)
MONOCYTES NFR BLD AUTO: 5 %
NEUTROPHILS # BLD AUTO: 3.3 X10E3/UL (ref 1.4–7)
NEUTROPHILS NFR BLD AUTO: 55 %
PLATELET # BLD AUTO: 374 X10E3/UL (ref 150–450)
POTASSIUM SERPL-SCNC: 4.6 MMOL/L (ref 3.5–5.2)
PROT SERPL-MCNC: 6.6 G/DL (ref 6–8.5)
RBC # BLD AUTO: 4.65 X10E6/UL (ref 3.77–5.28)
SODIUM SERPL-SCNC: 143 MMOL/L (ref 134–144)
TRIGL SERPL-MCNC: 173 MG/DL (ref 0–149)
VLDLC SERPL CALC-MCNC: 31 MG/DL (ref 5–40)
WBC # BLD AUTO: 5.9 X10E3/UL (ref 3.4–10.8)

## 2022-11-15 ENCOUNTER — TELEPHONE (OUTPATIENT)
Dept: FAMILY MEDICINE CLINIC | Facility: CLINIC | Age: 57
End: 2022-11-15

## 2022-11-15 NOTE — TELEPHONE ENCOUNTER
----- Message from Niki Collins DO sent at 11/15/2022  8:02 AM EST -----  Please let patient know that her hepatitis C screen was negative  Her cholesterol has increased from 2 years ago.  I calculated her risk for Heart attack and stroke in the next 10 years and is low enough that she does not need to be on a statin right now.  But increasing exercise and decreasing foods with high cholesterol in her diet would definitely be helpful  Her vitamin D is a little low at 25, taking about 1000 -1500 units of vitamin D daily would be helpful and we can recheck this in about 3 months  Her electrolytes, kidney function liver function are normal, her white and red cells are normal

## 2022-12-09 ENCOUNTER — OFFICE VISIT (OUTPATIENT)
Dept: FAMILY MEDICINE CLINIC | Facility: CLINIC | Age: 57
End: 2022-12-09

## 2022-12-09 VITALS
OXYGEN SATURATION: 98 % | HEART RATE: 81 BPM | BODY MASS INDEX: 37.21 KG/M2 | TEMPERATURE: 98.6 F | SYSTOLIC BLOOD PRESSURE: 130 MMHG | HEIGHT: 63 IN | RESPIRATION RATE: 16 BRPM | DIASTOLIC BLOOD PRESSURE: 70 MMHG | WEIGHT: 210 LBS

## 2022-12-09 DIAGNOSIS — F41.9 ANXIETY AND DEPRESSION: ICD-10-CM

## 2022-12-09 DIAGNOSIS — M25.562 CHRONIC PAIN OF BOTH KNEES: Primary | ICD-10-CM

## 2022-12-09 DIAGNOSIS — E66.09 CLASS 2 OBESITY DUE TO EXCESS CALORIES WITHOUT SERIOUS COMORBIDITY WITH BODY MASS INDEX (BMI) OF 37.0 TO 37.9 IN ADULT: ICD-10-CM

## 2022-12-09 DIAGNOSIS — M25.561 CHRONIC PAIN OF BOTH KNEES: Primary | ICD-10-CM

## 2022-12-09 DIAGNOSIS — F32.A ANXIETY AND DEPRESSION: ICD-10-CM

## 2022-12-09 DIAGNOSIS — G89.29 CHRONIC PAIN OF BOTH KNEES: Primary | ICD-10-CM

## 2022-12-09 PROCEDURE — 99214 OFFICE O/P EST MOD 30 MIN: CPT | Performed by: STUDENT IN AN ORGANIZED HEALTH CARE EDUCATION/TRAINING PROGRAM

## 2022-12-09 RX ORDER — ESCITALOPRAM OXALATE 20 MG/1
20 TABLET ORAL DAILY
Qty: 30 TABLET | Refills: 1 | Status: SHIPPED | OUTPATIENT
Start: 2022-12-09 | End: 2023-02-03 | Stop reason: ALTCHOICE

## 2022-12-09 NOTE — PROGRESS NOTES
Subjective   Trish Daley is a 57 y.o. female.   Chief Complaint   Patient presents with   • Knee Problem     Bilateral         History of Present Illness     Knee problem:  - Had issues with knees since she was 10 yrs old. Noticed pain really worsening about 2 years ago  - Bilateral knee right worse than left  -Symptoms:  Popping, weak, painful (sharp, stabbing)   -Treatment,  Ibuprofen Tylenol PM with some improvement  -X-rays done 04/2022: showed moderate osteoarthritis bilaterally  - did have PT for left knee when dog knocked her down. Billing for therapy was behind. Was an ortho clinic in [place]. Not interested in returning  - Lives 35-40 minutes from this office but does work in [place] and knows where [facility] is    Anxiety  - started escitalopram 10mg, takes off the edge but hasn't noticed much difference        The following portions of the patient's history were reviewed and updated as appropriate: allergies, current medications, past family history, past medical history, past social history, past surgical history and problem list.    Patient Active Problem List   Diagnosis   • Class 2 obesity due to excess calories without serious comorbidity with body mass index (BMI) of 37.0 to 37.9 in adult       Current Outpatient Medications on File Prior to Visit   Medication Sig Dispense Refill   • albuterol sulfate  (90 Base) MCG/ACT inhaler Inhale 2 puffs.     • diphenhydrAMINE-acetaminophen (TYLENOL PM)  MG tablet per tablet Take 1 tablet by mouth At Night As Needed for Sleep.     • Vitamin D, Cholecalciferol, (CHOLECALCIFEROL) 10 MCG (400 UNIT) tablet Take 400 Units by mouth Daily.     • hydrOXYzine (ATARAX) 10 MG tablet Take 1 tablet by mouth 3 (Three) Times a Day As Needed for Itching. 90 tablet 1   • Omega-3 Fatty Acids (FISH OIL) 1000 MG capsule capsule Take  by mouth Daily With Breakfast.       No current facility-administered medications on file prior to visit.  "    Current outpatient and discharge medications have been reconciled for the patient.  Reviewed by: Niki Collins DO      Allergies   Allergen Reactions   • Cantaloupe (Diagnostic) Other (See Comments)     Ear inflamation   • Codeine Arrhythmia   • Cucumber Extract Other (See Comments)     Cucumbers earache   • Oxycodone-Acetaminophen Arrhythmia   • Pepper Other (See Comments)     Bell peppers/inflamation of ears and bowel system   • Watermelon [Citrullus Vulgaris] Other (See Comments)     earache         Objective   Visit Vitals  /70 (BP Location: Right arm, Patient Position: Sitting, Cuff Size: Large Adult)   Pulse 81   Temp 98.6 °F (37 °C) (Skin)   Resp 16   Ht 160 cm (63\")   Wt 95.3 kg (210 lb)   SpO2 98%   BMI 37.20 kg/m²       Physical Exam  HENT:      Head: Normocephalic.   Eyes:      Conjunctiva/sclera: Conjunctivae normal.   Cardiovascular:      Rate and Rhythm: Normal rate and regular rhythm.      Heart sounds: Normal heart sounds.   Pulmonary:      Effort: Pulmonary effort is normal. No respiratory distress.      Breath sounds: Normal breath sounds. No wheezing, rhonchi or rales.   Neurological:      Mental Status: She is alert.           Diagnoses and all orders for this visit:    1. Chronic pain of both knees (Primary)  -     Ambulatory Referral to Physical Therapy Evaluate and treat for [facility] rehab    2. Anxiety and depression  -     escitalopram (Lexapro) increased from 10mg to  20 MG tablet; Take 1 tablet by mouth Daily.  Dispense: 30 tablet; Refill: 1    3. Class 2 obesity due to excess calories without serious comorbidity with body mass index (BMI) of 37.0 to 37.9 in adult  Assessment & Plan:  Patient's (Body mass index is 37.2 kg/m².) indicates that they are obese (BMI >30) with health conditions that include none . Weight is unchanged. BMI is is above average; BMI management plan is completed. We discussed portion control and increasing exercise.                Follow Up  - 1 " month for anxiety/depression and update on knees    Expected course, medications, and adverse effects discussed as appropriate.  Call or return if worsening or persistent symptoms.  I wore protective equipment throughout this patient encounter to include mask and eye protection. Hand hygiene was performed before donning protective equipment and after removal when leaving the room.    This document is intended for medical expert use only. Reading of this document by patients and/or patient's family without participating medical staff guidance may result in misinterpretation and unintended morbidity. Any interpretation of such data is the responsibility of the patient and/or family member responsible for the patient in concert with their primary or specialist providers, not to be left for sources of online searches such as [employer], [employer] or similar queries. Relying on these approaches to knowledge may result in misinterpretation, misguided goals of care and even death should patients or family members try recommendations outside of the realm of professional medical care.

## 2023-02-03 ENCOUNTER — OFFICE VISIT (OUTPATIENT)
Dept: FAMILY MEDICINE CLINIC | Facility: CLINIC | Age: 58
End: 2023-02-03
Payer: COMMERCIAL

## 2023-02-03 ENCOUNTER — TELEPHONE (OUTPATIENT)
Dept: FAMILY MEDICINE CLINIC | Facility: CLINIC | Age: 58
End: 2023-02-03

## 2023-02-03 VITALS
HEIGHT: 63 IN | TEMPERATURE: 97.3 F | HEART RATE: 79 BPM | SYSTOLIC BLOOD PRESSURE: 128 MMHG | OXYGEN SATURATION: 96 % | WEIGHT: 216 LBS | DIASTOLIC BLOOD PRESSURE: 70 MMHG | RESPIRATION RATE: 16 BRPM | BODY MASS INDEX: 38.27 KG/M2

## 2023-02-03 DIAGNOSIS — F32.A ANXIETY AND DEPRESSION: ICD-10-CM

## 2023-02-03 DIAGNOSIS — F41.9 ANXIETY AND DEPRESSION: ICD-10-CM

## 2023-02-03 DIAGNOSIS — E66.09 CLASS 2 OBESITY DUE TO EXCESS CALORIES WITHOUT SERIOUS COMORBIDITY WITH BODY MASS INDEX (BMI) OF 37.0 TO 37.9 IN ADULT: ICD-10-CM

## 2023-02-03 DIAGNOSIS — R73.03 PREDIABETES: ICD-10-CM

## 2023-02-03 DIAGNOSIS — Z00.00 ANNUAL PHYSICAL EXAM: Primary | ICD-10-CM

## 2023-02-03 PROBLEM — J45.20 MILD INTERMITTENT ASTHMA WITHOUT COMPLICATION: Status: ACTIVE | Noted: 2023-02-03

## 2023-02-03 LAB
EXPIRATION DATE: NORMAL
HBA1C MFR BLD: 5.6 %
Lab: NORMAL

## 2023-02-03 PROCEDURE — 83036 HEMOGLOBIN GLYCOSYLATED A1C: CPT | Performed by: STUDENT IN AN ORGANIZED HEALTH CARE EDUCATION/TRAINING PROGRAM

## 2023-02-03 PROCEDURE — 99213 OFFICE O/P EST LOW 20 MIN: CPT | Performed by: STUDENT IN AN ORGANIZED HEALTH CARE EDUCATION/TRAINING PROGRAM

## 2023-02-03 PROCEDURE — 99396 PREV VISIT EST AGE 40-64: CPT | Performed by: STUDENT IN AN ORGANIZED HEALTH CARE EDUCATION/TRAINING PROGRAM

## 2023-02-03 RX ORDER — SERTRALINE HYDROCHLORIDE 25 MG/1
25 TABLET, FILM COATED ORAL DAILY
Qty: 30 TABLET | Refills: 1 | Status: SHIPPED | OUTPATIENT
Start: 2023-02-03

## 2023-02-03 NOTE — PATIENT INSTRUCTIONS
- Work up to 150 minutes of aerobic, moderate intensity (you can talk but you can't sing) or 75 minutes of vigorous activity of dedicated exercise a week  - 2 days a week, include resistance/weight training    Light intensity   - Ex: casual walking, light housework, stretching  Moderate Intensity   - brisk walking, water aerobics, ballroom dancing   - breathing will be a little harder with a faster heartbeat  Vigorous Intensity   - jogging/running, aerobic dancing    What are the benefits of movement?    Moving your body has many benefits. It can:    ?Burn calories, which helps people control their weight  ?Help control blood sugar levels in people with diabetes  ?Lower blood pressure, especially in people with high blood pressure  ?Lower stress and help with depression and anxiety  ?Keep bones strong, so they don't get thin and break easily  ?Lower the chance of dying from heart disease    Adding even small amounts of physical activity to your daily routine can improve your health.    What are the main types of exercise?    There are 3 main types of exercise. They are:    ?Aerobic exercise - Aerobic exercise raises a person's heart rate. Examples of aerobic exercise are walking, running, dancing, riding a bike, or swimming.  ?Resistance training - Resistance training helps make your muscles stronger. People can do this type of exercise using weights, exercise bands, or weight machines. You can also do this type of exercise using your own body weight, as with push-ups, or by lifting items in your home, like jugs of water.   ?Stretching - Stretching exercises help your muscles and joints move more easily.    It's important to have all 3 types of exercise in your exercise program. That way, your body, muscles, and joints can be as healthy as possible.    Should I talk to my doctor or nurse before I start exercising?    If you have not exercised before or have not exercised in a long time, talk with your doctor or  nurse before you start a very active exercise program.  If you have heart disease or risk factors for heart disease (like high blood pressure or diabetes), your doctor or nurse might recommend that you have an exercise test before starting an exercise program.  When you start an exercise program, start slowly. For example, do the exercise at a slow pace or for a few minutes only. Over time, you can exercise faster and for longer periods of time.  What should I do when I exercise? -- Each time you exercise, you should:    ?Warm up - Warming up can help keep you from hurting your muscles when you exercise. To warm up, do a light aerobic exercise (such as walking slowly) or stretch for 5 to 10 minutes.  ?Work out - You should try to get a mix of aerobic exercise, resistance training, and stretching. During an aerobic workout, you can walk fast, swim, run, or use an exercise machine, for example. Other activities, like dancing or playing tennis, are also forms of aerobic exercise. You should also take time to stretch all of your joints, including your neck, shoulders, back, hips, and knees. At least 2 times a week, you can do resistance training exercises as part of your workout.  ?Cool down - Cooling down helps keep you from feeling dizzy after you exercise and helps prevent muscle cramps. To cool down, you can stretch or do a light aerobic exercise for 5 minutes.    Some people go to a gym or do group exercise classes. But you can exercise even without these things. Some exercises can be done even in a small space. You can also try online videos or smartphone apps to get ideas for different types of exercise.     How often should I exercise?     Doctors recommend that people exercise at least 30 minutes a day, on 5 or more days of the week.  If you can't exercise for 30 minutes straight, try to exercise for 10 minutes at a time, 3 or 4 times a day. Even exercising for shorter amounts of time is good for you,  especially if it means spending less time sitting.     When should I call my doctor or nurse? -- If you have any of the following symptoms when you exercise, stop exercising and call your doctor or nurse right away:  ?Pain or pressure in your chest, arms, throat, jaw, or back  ?Nausea or vomiting  ?Feeling like your heart is fluttering or racing very fast  ?Feeling dizzy or faint    What if I don't have time to exercise?     Many people have very busy lives and might not think that they have time to exercise. But it's important to try to find time to exercise, even if you are tired or work a lot. Exercise can increase your energy level, which can make you feel better and might even help you get more work done.  Even if it's hard to set aside a lot of time to exercise, you can still improve your health by moving your body more. There are many ways that you can be more active. For example, you can:    ?Take the stairs instead of the elevator  ?Park in a parking space that is farther away from the door  ?Take a longer route when you walk from one place to another    Spending a lot of time sitting still - for example, watching television or working on the computer - can be bad for your health. Try to get up and move around whenever you can. Even small amounts of movement, like taking short walks, doing household chores, or gardening, can help improve your health. Finding activities you enjoy, or doing them with other people, can help you add more movement into your daily life.    What else should I do when I exercise?     To exercise safely and avoid problems, it's important to:    ?Drink fluids during and after exercising (but avoid drinks with a lot of caffeine or sugar)  ?Avoid exercising outside if it is too hot or cold  ?Wear layers of clothes, so that you can take them off if you get too hot  ?Wear shoes that fit well and support your feet  ?Be aware of your surroundings if you exercise outside

## 2023-02-03 NOTE — ASSESSMENT & PLAN NOTE
Patient's (Body mass index is 38.26 kg/m².) indicates that they are obese (BMI >30) with health conditions that include none . Weight is unchanged. BMI  is above average; BMI management plan is completed. We discussed portion control and increasing exercise.

## 2023-02-03 NOTE — PROGRESS NOTES
Chief Complaint   Patient presents with   • Annual Exam     Subjective   Trish Daley is a 57 y.o. female here for her annual physical with me.   Trish is here for coordination of medical care, to discuss health maintenance, disease prevention as well as to followup on medical problems.     Annual physical exam  Patient's last PE was unknown.  Patient's medication list, medical history and allergy list were reviewed and updated  Activity level is minimal.   Exercises 2 per week.   Appetite is poor.   Feels fairly well with few complaints.   Energy level is poor.   Sleeps fairly well.   Patient's last colonoscopy was never. Pt was given green packet last visit.  Patient has not mailed in her green packet to get herself scheduled with gastroenterology of [facility] for colonoscopy as her  is starting a new job and insurance will kick in until March  Patient's last mammogram was 2022.   Patient's last pap smear was .  She is HPV unknown  Patient is doing routine self skin exam occasionally.   Patient is doing routine self-breast exams never.      Anxiety/depression  -Increased up from 10 mg Lexapro up to lexapro 20mg  - has had a few anxiety attacks. Had a bad week. Had friends' parents die,  changed job and dog   - feels like this dose takes the edge off her anxiety but she still experiences a lot of symptoms of her anxiety and depression    The following portions of the patient's history were reviewed and updated as appropriate: allergies, current medications, past family history, past medical history, past social history, past surgical history and problem list.      Past Medical History :  Active Ambulatory Problems     Diagnosis Date Noted   • Class 2 obesity due to excess calories without serious comorbidity with body mass index (BMI) of 37.0 to 37.9 in adult 2022   • Mild intermittent asthma without complication 2023     Resolved Ambulatory Problems     Diagnosis  Date Noted   • No Resolved Ambulatory Problems     No Additional Past Medical History       Medication List:    Current Outpatient Medications:   •  albuterol sulfate  (90 Base) MCG/ACT inhaler, Inhale 2 puffs., Disp: , Rfl:   •  diphenhydrAMINE-acetaminophen (TYLENOL PM)  MG tablet per tablet, Take 1 tablet by mouth At Night As Needed for Sleep., Disp: , Rfl:   •  Omega-3 Fatty Acids (FISH OIL) 1000 MG capsule capsule, Take  by mouth Daily With Breakfast., Disp: , Rfl:   •  Vitamin D, Cholecalciferol, (CHOLECALCIFEROL) 10 MCG (400 UNIT) tablet, Take 400 Units by mouth Daily., Disp: , Rfl:   •  sertraline (Zoloft) 25 MG tablet, Take 1 tablet by mouth Daily., Disp: 30 tablet, Rfl: 1    Allergies:  Allergies   Allergen Reactions   • Cantaloupe (Diagnostic) Other (See Comments)     Ear inflamation   • Codeine Arrhythmia   • Cucumber Extract Other (See Comments)     Cucumbers earache   • Oxycodone-Acetaminophen Arrhythmia   • Pepper Other (See Comments)     Bell peppers/inflamation of ears and bowel system   • Watermelon [Citrullus Vulgaris] Other (See Comments)     earache       Social History:  Social History     Socioeconomic History   • Marital status:    Tobacco Use   • Smoking status: Never     Passive exposure: Past (father smoked as pt was growing up)   • Smokeless tobacco: Never   Vaping Use   • Vaping Use: Never used   Substance and Sexual Activity   • Alcohol use: Yes     Comment: 1 glass of wine weekly   • Drug use: Never   • Sexual activity: Yes     Partners: Male     Comment: monogamous with        Family History:  Family History   Problem Relation Age of Onset   • Hypertension Mother    • COPD Mother    • Other Father         Diverticulitis   • Cancer Father    • Hypertension Father    • COPD Father    • Hypertension Sister    • Heart disease Sister    • Other Sister         scoliosis   • Hypertension Daughter    • Hypertension Son        Past Surgical History:  Past Surgical  "History:   Procedure Laterality Date   • FRACTURE SURGERY  2004    left wrist   • HERNIA REPAIR     • HYSTERECTOMY  2001       PHQ-2 Depression Screening  Little interest or pleasure in doing things?     Feeling down, depressed, or hopeless?     PHQ-2 Total Score       Health Maintenance   Topic Date Due   • COLORECTAL CANCER SCREENING  Never done   • COVID-19 Vaccine (1) Never done   • INFLUENZA VACCINE  03/31/2023 (Originally 8/1/2022)   • TDAP/TD VACCINES (1 - Tdap) 02/03/2024 (Originally 6/23/1984)   • ZOSTER VACCINE (1 of 2) 02/03/2024 (Originally 6/23/2015)   • ANNUAL PHYSICAL  02/03/2024   • MAMMOGRAM  04/27/2024   • HEPATITIS C SCREENING  Completed   • Pneumococcal Vaccine 0-64  Aged Out   • PAP SMEAR  Discontinued         There is no immunization history on file for this patient.          Physical Exam:  Vital Signs:  /70 (BP Location: Right arm, Patient Position: Sitting, Cuff Size: Large Adult)   Pulse 79   Temp 97.3 °F (36.3 °C) (Skin)   Resp 16   Ht 160 cm (63\")   Wt 98 kg (216 lb)   SpO2 96%   BMI 38.26 kg/m²     Physical Exam  Constitutional:       General: She is not in acute distress.     Appearance: Normal appearance. She is obese.   HENT:      Head: Normocephalic and atraumatic.      Right Ear: Tympanic membrane normal.      Left Ear: Tympanic membrane normal.      Nose: Nose normal.      Mouth/Throat:      Mouth: Mucous membranes are moist.      Pharynx: Oropharynx is clear. No posterior oropharyngeal erythema.   Eyes:      Extraocular Movements: Extraocular movements intact.      Conjunctiva/sclera: Conjunctivae normal.   Neck:      Comments: - Thyroid not enlarged  Cardiovascular:      Rate and Rhythm: Normal rate and regular rhythm.      Heart sounds: Normal heart sounds.   Pulmonary:      Effort: Pulmonary effort is normal. No respiratory distress.      Breath sounds: Normal breath sounds. No stridor. No wheezing, rhonchi or rales.   Abdominal:      General: Abdomen is flat. " Bowel sounds are normal.      Palpations: Abdomen is soft. There is no mass.      Tenderness: There is no abdominal tenderness. There is no guarding.   Musculoskeletal:         General: No swelling or deformity. Normal range of motion.      Cervical back: Normal range of motion and neck supple.   Skin:     General: Skin is warm and dry.      Capillary Refill: Capillary refill takes less than 2 seconds.      Coloration: Skin is not jaundiced.      Findings: No rash.   Neurological:      General: No focal deficit present.      Mental Status: She is alert and oriented to person, place, and time.      Cranial Nerves: No cranial nerve deficit.      Motor: No weakness.      Coordination: Coordination normal.      Gait: Gait normal.      Deep Tendon Reflexes: Reflexes normal.   Psychiatric:         Mood and Affect: Mood normal.         Behavior: Behavior normal.         Thought Content: Thought content normal.           Assessment and Plan:  Diagnoses and all orders for this visit:    1. Annual physical exam (Primary)  -Normal 57-year-old female exam  -Pertinent lab work is already been drawn.  Checking on prediabetes via POC A1c as below  -Discussed with patient to go ahead and mail in her green packet and schedule with gastroenterology but schedule out beyond March.  Patient agreeable    2. Prediabetes  -     POC Glycosylated Hemoglobin (Hb A1C): 5.6%  -Patient has fallen just underneath prediabetic range.  We will keep an eye on this annually    3. Anxiety and depression  -Patient has been on the maximum of Lexapro and still feeling reasonably strong symptoms of anxiety depression.  Switching to Zoloft  -   sertraline (Zoloft) 25 MG tablet; Take 1 tablet by mouth Daily.  Dispense: 30 tablet; Refill: 1  -Follow-up in 1 month    4. Class 2 obesity due to excess calories without serious comorbidity with body mass index (BMI) of 37.0 to 37.9 in adult  Assessment & Plan:  Patient's (Body mass index is 38.26 kg/m².) indicates  that they are obese (BMI >30) with health conditions that include none . Weight is unchanged. BMI  is above average; BMI management plan is completed. We discussed portion control and increasing exercise.       Follow-up  -1 month for anxiety and depression  -1 year for annual physical exam    Discussed screening for common diseases.  Discussed timing of cervical cancer screening with pap smear and HPV testing. Encouraged self-breast exams, clinical breast exams, and discussed timing of mammograms.  Recommend yearly eye exams. Also discussed monitoring of blood pressure, lipids, blood sugars.      Mammography ordered.  Colon cancer screening options discussed, including Colonoscopy, Cologuard and Hemoccult cards.  Occult blood testing negative in office today.  She was given additional information on the Cologuard testing and can contact the office if order desired.     I wore protective equipment throughout this patient encounter to include mask and eye protection. Hand hygiene was performed before donning protective equipment and after removal when leaving the room.

## 2023-02-20 ENCOUNTER — TELEPHONE (OUTPATIENT)
Dept: FAMILY MEDICINE CLINIC | Facility: CLINIC | Age: 58
End: 2023-02-20
Payer: COMMERCIAL

## 2023-02-20 DIAGNOSIS — R79.89 LOW VITAMIN D LEVEL: Primary | ICD-10-CM

## 2023-02-20 NOTE — TELEPHONE ENCOUNTER
Spoke to pt 02/20/2023, 10:11 am advised pt lab order has been placed to repeat Vitamin D level.  Pt states she is out of town will do it when she returns

## 2023-02-20 NOTE — TELEPHONE ENCOUNTER
Patient's vitamin D was low at 25 on 11/11/2022.  Recommend she start taking 1000 to 1500 international units of vitamin D daily.  Rechecking vitamin D

## 2023-04-21 ENCOUNTER — TELEPHONE (OUTPATIENT)
Dept: FAMILY MEDICINE CLINIC | Facility: CLINIC | Age: 58
End: 2023-04-21
Payer: COMMERCIAL

## 2023-05-25 LAB — 25(OH)D3+25(OH)D2 SERPL-MCNC: 50.8 NG/ML (ref 30–100)

## 2023-05-30 ENCOUNTER — TELEPHONE (OUTPATIENT)
Dept: FAMILY MEDICINE CLINIC | Facility: CLINIC | Age: 58
End: 2023-05-30

## 2023-05-30 NOTE — TELEPHONE ENCOUNTER
----- Message from Niki Collins DO sent at 5/27/2023  9:37 PM EDT -----  please let patient know her vitamin D is now comfortably in the normal range

## 2023-06-02 ENCOUNTER — HOSPITAL ENCOUNTER (OUTPATIENT)
Dept: GENERAL RADIOLOGY | Facility: HOSPITAL | Age: 58
Discharge: HOME | End: 2023-06-02

## 2023-06-02 ENCOUNTER — OFFICE VISIT (OUTPATIENT)
Dept: FAMILY MEDICINE CLINIC | Facility: CLINIC | Age: 58
End: 2023-06-02

## 2023-06-02 VITALS
RESPIRATION RATE: 18 BRPM | TEMPERATURE: 96 F | WEIGHT: 217.6 LBS | HEIGHT: 63 IN | HEART RATE: 82 BPM | SYSTOLIC BLOOD PRESSURE: 130 MMHG | BODY MASS INDEX: 38.55 KG/M2 | OXYGEN SATURATION: 96 % | DIASTOLIC BLOOD PRESSURE: 64 MMHG

## 2023-06-02 DIAGNOSIS — H92.03 ACUTE EAR PAIN, BILATERAL: ICD-10-CM

## 2023-06-02 DIAGNOSIS — M54.2 ACUTE NECK PAIN: Primary | ICD-10-CM

## 2023-06-02 DIAGNOSIS — E66.09 CLASS 2 OBESITY DUE TO EXCESS CALORIES WITHOUT SERIOUS COMORBIDITY WITH BODY MASS INDEX (BMI) OF 37.0 TO 37.9 IN ADULT: ICD-10-CM

## 2023-06-02 PROCEDURE — 72040 X-RAY EXAM NECK SPINE 2-3 VW: CPT

## 2023-06-02 NOTE — PROGRESS NOTES
"Subjective   Trish Daley is a 57 y.o. female.   Chief Complaint   Patient presents with   • Neck Pain   • Bilateral ear pain     Right worse than left       History of Present Illness       Bilateral Ear pain  -Started 1 month ago  -Symptoms: right worse than left, aching, popping, sharp upon turning head to left,  waxing and waning  -Treatment:  OTC ear drops with slight improvement  - has environmental allergies. Doesn't take anything other than mucinex prn for it.  She typically just \"muscle through it\"  -Has not noticed jaw pain    Neck Pain  -Started 1 month ago  -Pain is the worst in her neck muscles at the base of the skull around the occiput  -Pain is worse in the morning.  Her muscles are very tight and loosens up as the day goes on  -Patient has switched pillows (currently uses a MyPillow), has changed how her computer is set up to help with her head position  -She has tried ice (helps), TENS unit (no help), ibuprofen, heat (makes it worse)  -Patient's mother told her that she may have meningitis and wanted her to get checked out.  Patient herself is not worried about being sick but would like an x-ray of the neck    Preventative  -Patient scheduled to get her colonoscopy in July        The following portions of the patient's history were reviewed and updated as appropriate: allergies, current medications, past family history, past medical history, past social history, past surgical history and problem list.    Patient Active Problem List   Diagnosis   • Class 2 obesity due to excess calories without serious comorbidity with body mass index (BMI) of 37.0 to 37.9 in adult   • Mild intermittent asthma without complication       Current Outpatient Medications on File Prior to Visit   Medication Sig Dispense Refill   • albuterol sulfate  (90 Base) MCG/ACT inhaler Inhale 2 puffs.     • diphenhydrAMINE-acetaminophen (TYLENOL PM)  MG tablet per tablet Take 1 tablet by mouth At Night As Needed for " "Sleep.     • [facility] (Osteo Bi-Flex/5-Loxin Advanced) tablet      • vitamin D3 125 MCG (5000 UT) capsule capsule      • [DISCONTINUED] Omega-3 Fatty Acids (FISH OIL) 1000 MG capsule capsule Take  by mouth Daily With Breakfast.     • [DISCONTINUED] sertraline (Zoloft) 25 MG tablet Take 1 tablet by mouth Daily. 30 tablet 1   • [DISCONTINUED] Vitamin D, Cholecalciferol, (CHOLECALCIFEROL) 10 MCG (400 UNIT) tablet Take 400 Units by mouth Daily.       No current facility-administered medications on file prior to visit.     Current outpatient and discharge medications have been reconciled for the patient.  Reviewed by: Niki Collins DO      Allergies   Allergen Reactions   • Cantaloupe (Diagnostic) Other (See Comments)     Ear inflamation   • Codeine Arrhythmia   • Cucumber Extract Other (See Comments)     Cucumbers earache   • Oxycodone-Acetaminophen Arrhythmia   • Pepper Other (See Comments)     Bell peppers/inflamation of ears and bowel system   • Watermelon [Citrullus Vulgaris] Other (See Comments)     earache         Objective   Visit Vitals  /64 (BP Location: Right arm, Patient Position: Sitting, Cuff Size: Large Adult)   Pulse 82   Temp 96 °F (35.6 °C) (Skin)   Resp 18   Ht 160 cm (63\")   Wt 98.7 kg (217 lb 9.6 oz)   SpO2 96%   BMI 38.55 kg/m²       Physical Exam  HENT:      Head: Normocephalic and atraumatic.      Ears:      Comments: - Serous fluid behind bilateral tympanic membranes.  No exudate, no erythema and no bulging noted  Eyes:      Conjunctiva/sclera: Conjunctivae normal.   Musculoskeletal:      Comments: - AGR of neck  Showed diffuse somatic dysfunction of the neck musculature bilaterally.  Worst near the skull    -No pain with pressure to the masseter muscles bilaterally.  No pain or popping with opening the jaw   Neurological:      General: No focal deficit present.      Mental Status: She is alert and oriented to person, place, and time.   Psychiatric:         Mood and Affect: " Mood normal.         Behavior: Behavior normal.           Diagnoses and all orders for this visit:    1. Acute neck pain (Primary)  -     XR Spine Cervical 2 or 3 View to look for any bony abnormalities  -Did recommend that physical therapy would be appropriate considering that patient has done a lot of different conservative measures to help reduce her neck pain but has been unsuccessful.   -Patient politely declined but did tell patient it is on the table if she wants it    2. Acute ear pain, bilateral  -Discussed that allergies could be causing her to have ear pain (did see serous fluid behind both tympanic membranes via the physical exam which would support that)  -Recommended trying an over-the-counter allergy medication (fexofenadine/loratadine/cetirizine) in addition to Flonase to help reduce possible congestion and pressure that may be manifesting in her ear  -Told patient to try 1 over-the-counter medication for a few weeks and if that is not working, try a different 1 to see if she reacts better to that 1  -If she is not noticing any improvement, asked her to come back in      3. Class 2 obesity due to excess calories without serious comorbidity with body mass index (BMI) of 37.0 to 37.9 in adult  Assessment & Plan:  Patient's (Body mass index is 38.55 kg/m².) indicates that they are obese (BMI >30) with health conditions that include none . Weight is unchanged. BMI  is above average; BMI management plan is completed. We discussed portion control and increasing exercise.         Follow Up  -As needed    Expected course, medications, and adverse effects discussed as appropriate.  Call or return if worsening or persistent symptoms.   Hand hygiene was performed before donning protective equipment and after removal when leaving the room.    This document is intended for medical expert use only. Reading of this document by patients and/or patient's family without participating medical staff guidance may result  in misinterpretation and unintended morbidity. Any interpretation of such data is the responsibility of the patient and/or family member responsible for the patient in concert with their primary or specialist providers, not to be left for sources of online searches such as [employer], [employer] or similar queries. Relying on these approaches to knowledge may result in misinterpretation, misguided goals of care and even death should patients or family members try recommendations outside of the realm of professional medical care.

## 2023-06-02 NOTE — ASSESSMENT & PLAN NOTE
Patient's (Body mass index is 38.55 kg/m².) indicates that they are obese (BMI >30) with health conditions that include none . Weight is unchanged. BMI  is above average; BMI management plan is completed. We discussed portion control and increasing exercise.

## 2023-06-05 ENCOUNTER — TELEPHONE (OUTPATIENT)
Dept: FAMILY MEDICINE CLINIC | Facility: CLINIC | Age: 58
End: 2023-06-05
Payer: COMMERCIAL

## 2023-06-05 NOTE — TELEPHONE ENCOUNTER
----- Message from Niki Collins DO sent at 6/5/2023  7:48 AM EDT -----  Patient's neck x-ray showed no evidence of fracture.  It did show some mild to moderate spondylosis (abnormal wear on the cartilage between the vertebra which can shift the vertebrae forward or back a little) along with moderate degenerative changes (arthritis) in her neck.

## 2023-07-26 ENCOUNTER — ON CAMPUS - OUTPATIENT (OUTPATIENT)
Dept: URBAN - METROPOLITAN AREA HOSPITAL 2 | Facility: HOSPITAL | Age: 58
End: 2023-07-26

## 2023-07-26 VITALS
SYSTOLIC BLOOD PRESSURE: 120 MMHG | OXYGEN SATURATION: 100 % | SYSTOLIC BLOOD PRESSURE: 135 MMHG | DIASTOLIC BLOOD PRESSURE: 97 MMHG | RESPIRATION RATE: 18 BRPM | DIASTOLIC BLOOD PRESSURE: 83 MMHG | RESPIRATION RATE: 16 BRPM | SYSTOLIC BLOOD PRESSURE: 158 MMHG | OXYGEN SATURATION: 97 % | SYSTOLIC BLOOD PRESSURE: 116 MMHG | OXYGEN SATURATION: 98 % | SYSTOLIC BLOOD PRESSURE: 155 MMHG | HEART RATE: 81 BPM | DIASTOLIC BLOOD PRESSURE: 93 MMHG | OXYGEN SATURATION: 95 % | TEMPERATURE: 96.9 F | SYSTOLIC BLOOD PRESSURE: 166 MMHG | HEART RATE: 96 BPM | DIASTOLIC BLOOD PRESSURE: 66 MMHG | HEART RATE: 88 BPM | RESPIRATION RATE: 20 BRPM | HEART RATE: 84 BPM | DIASTOLIC BLOOD PRESSURE: 81 MMHG | DIASTOLIC BLOOD PRESSURE: 107 MMHG | RESPIRATION RATE: 19 BRPM | HEIGHT: 63 IN | OXYGEN SATURATION: 96 % | DIASTOLIC BLOOD PRESSURE: 71 MMHG | HEART RATE: 79 BPM | DIASTOLIC BLOOD PRESSURE: 65 MMHG | WEIGHT: 213 LBS | OXYGEN SATURATION: 94 % | SYSTOLIC BLOOD PRESSURE: 147 MMHG

## 2023-07-26 DIAGNOSIS — Z80.0 FAMILY HISTORY OF MALIGNANT NEOPLASM OF DIGESTIVE ORGANS: ICD-10-CM

## 2023-07-26 DIAGNOSIS — K57.30 DIVERTICULOSIS OF LARGE INTESTINE WITHOUT PERFORATION OR ABS: ICD-10-CM

## 2023-07-26 DIAGNOSIS — Z83.71 FAMILY HISTORY OF COLONIC POLYPS: ICD-10-CM

## 2023-07-26 DIAGNOSIS — Z12.11 ENCOUNTER FOR SCREENING FOR MALIGNANT NEOPLASM OF COLON: ICD-10-CM

## 2023-07-26 PROCEDURE — 45378 DIAGNOSTIC COLONOSCOPY: CPT | Mod: 33 | Performed by: INTERNAL MEDICINE

## 2023-10-09 ENCOUNTER — TELEPHONE (OUTPATIENT)
Dept: FAMILY MEDICINE CLINIC | Facility: CLINIC | Age: 58
End: 2023-10-09
Payer: COMMERCIAL

## 2023-10-09 NOTE — TELEPHONE ENCOUNTER
Spoke to pt 10/09/2023, appt made with Dr. Castle for 10/10/2023, 4:45 pm advised pt if pain or rash gets worse, seek medical attention at Urgent Care or ER

## 2023-10-09 NOTE — TELEPHONE ENCOUNTER
Caller: Trish Daley    Relationship: Self    Best call back number: [phone]    What medication are you requesting: POISON IVY MEDICATION    What are your current symptoms: POISON PILLO RASH    How long have you been experiencing symptoms: NOTICED IT 10/4/23    Have you had these symptoms before:    [] Yes  [x] No    Have you been treated for these symptoms before:   [] Yes  [x] No    If a prescription is needed, what is your preferred pharmacy and phone number: [id number] [address] [phone] [facility] [phone]      Additional notes:PATIENT IS REQUESTING IF SOME POISON IVY MEDIATION COULD BE CALLED IN AS SE STATED THE RASH KEEPS SPREADING.

## 2023-10-10 ENCOUNTER — OFFICE VISIT (OUTPATIENT)
Dept: FAMILY MEDICINE CLINIC | Facility: CLINIC | Age: 58
End: 2023-10-10
Payer: COMMERCIAL

## 2023-10-10 VITALS
OXYGEN SATURATION: 97 % | RESPIRATION RATE: 16 BRPM | HEIGHT: 63 IN | WEIGHT: 218.2 LBS | HEART RATE: 97 BPM | TEMPERATURE: 97.7 F | DIASTOLIC BLOOD PRESSURE: 96 MMHG | SYSTOLIC BLOOD PRESSURE: 138 MMHG | BODY MASS INDEX: 38.66 KG/M2

## 2023-10-10 DIAGNOSIS — E66.09 CLASS 2 OBESITY DUE TO EXCESS CALORIES WITHOUT SERIOUS COMORBIDITY WITH BODY MASS INDEX (BMI) OF 37.0 TO 37.9 IN ADULT: ICD-10-CM

## 2023-10-10 DIAGNOSIS — B37.2 CANDIDAL DERMATITIS: ICD-10-CM

## 2023-10-10 DIAGNOSIS — L23.7 ALLERGIC CONTACT DERMATITIS DUE TO PLANTS, EXCEPT FOOD: Primary | ICD-10-CM

## 2023-10-10 RX ORDER — KETOCONAZOLE 200 MG/1
200 TABLET ORAL DAILY
Qty: 14 TABLET | Refills: 0 | Status: SHIPPED | OUTPATIENT
Start: 2023-10-10

## 2023-10-10 RX ORDER — TRIAMCINOLONE ACETONIDE 1 MG/G
1 OINTMENT TOPICAL 2 TIMES DAILY
Qty: 80 G | Refills: 6 | Status: SHIPPED | OUTPATIENT
Start: 2023-10-10

## 2023-10-10 RX ORDER — CLOTRIMAZOLE AND BETAMETHASONE DIPROPIONATE 10; .64 MG/G; MG/G
1 CREAM TOPICAL 2 TIMES DAILY
Qty: 45 G | Refills: 12 | Status: SHIPPED | OUTPATIENT
Start: 2023-10-10

## 2023-10-10 NOTE — PROGRESS NOTES
"Chief Complaint  Rash    Subjective     CC  Problem List  Visit Diagnosis   Encounters  Notes  Medications  Labs  Result Review Imaging  Media    Trish Daley presents to [facility] for   History of Present Illness  Influenza immunization was not given due to patient refusal    Rash  This is a new problem. The current episode started 1 to 4 weeks ago (11 days). The problem has been waxing and waning since onset. The affected locations include the left arm, right arm, right hand, abdomen, left hand and neck. She was exposed to plant contact (Poison ivy). Pertinent negatives include no congestion, cough, diarrhea, fatigue, fever, rhinorrhea, shortness of breath, sore throat or vomiting. Treatments tried: Chalomine, Benadryl. The treatment provided mild relief.       Review of Systems   Constitutional:  Negative for fatigue and fever.   HENT:  Negative for congestion, rhinorrhea and sore throat.    Respiratory:  Negative for cough and shortness of breath.    Cardiovascular:  Negative for chest pain, palpitations and leg swelling.   Gastrointestinal:  Negative for abdominal pain, constipation, diarrhea, nausea and vomiting.   Skin:  Positive for rash (worse over ext surface of arms but also over face and beneath breasts.).   Hematological:  Negative for adenopathy. Does not bruise/bleed easily.        Objective   Vital Signs:   /96 (BP Location: Right arm, Patient Position: Sitting, Cuff Size: Adult)   Pulse 97   Temp 97.7 øF (36.5 øC) (Temporal)   Resp 16   Ht 160 cm (63\")   Wt 99 kg (218 lb 3.2 oz)   SpO2 97% Comment: room air  BMI 38.65 kg/mý     Physical Exam  Constitutional:       General: She is not in acute distress.     Appearance: She is obese.   Cardiovascular:      Rate and Rhythm: Normal rate and regular rhythm.      Heart sounds: No murmur heard.  Pulmonary:      Effort: Pulmonary effort is normal.      Breath sounds: Normal breath sounds. No " wheezing.   Musculoskeletal:      Cervical back: Neck supple.   Skin:     Findings: Rash (There is a maculopapular vesicular rash over the upper ext and face. There is a 2nd confluent rash under the breast consistent with candida.) present.   Neurological:      Mental Status: She is alert.        Result Review :Labs  Result Review  Imaging  Med Tab  Media                 Assessment and Plan CC Problem List  Visit Diagnosis  ROS  Review (Popup)  Health Maintenance  Quality  BestPractice  Medications  SmartSets  SnapShot Encounters  Media  Problem List Items Addressed This Visit          Unprioritized    Class 2 obesity due to excess calories without serious comorbidity with body mass index (BMI) of 37.0 to 37.9 in adult    Current Assessment & Plan     Patient's (Body mass index is 38.65 kg/mý.) indicates that they are obese (BMI >30) with health conditions that include none . Weight is unchanged. BMI  is above average; BMI management plan is completed. We discussed portion control and increasing exercise.           Other Visit Diagnoses       Allergic contact dermatitis due to plants, except food    -  Primary    Topical and po steroids local wound care. F.u if no imrpovement.    Relevant Medications    triamcinolone (KENALOG) 0.1 % ointment    clotrimazole-betamethasone (LOTRISONE) 1-0.05 % cream    Candidal dermatitis        Anit fungal. Cotton undergarments allow air beneath joseph asts.. Recent normal A1c  apt pending for repeat.    Relevant Medications    triamcinolone (KENALOG) 0.1 % ointment    clotrimazole-betamethasone (LOTRISONE) 1-0.05 % cream    ketoconazole (NIZORAL) 200 MG tablet            Follow Up Instructions Charge Capture  Follow-up Communications  No follow-ups on file.  Patient was given instructions and counseling regarding her condition or for health maintenance advice. Please see specific information pulled into the AVS if appropriate.   Answers submitted by the patient for  this visit:  Primary Reason for Visit (Submitted on 10/10/2023)  What is the primary reason for your visit?: Rash

## 2023-10-13 ENCOUNTER — TELEPHONE (OUTPATIENT)
Dept: FAMILY MEDICINE CLINIC | Facility: CLINIC | Age: 58
End: 2023-10-13
Payer: COMMERCIAL

## 2023-10-13 DIAGNOSIS — L23.7 ALLERGIC CONTACT DERMATITIS DUE TO PLANTS, EXCEPT FOOD: Primary | ICD-10-CM

## 2023-10-13 RX ORDER — PREDNISONE 5 MG/1
TABLET ORAL
Qty: 63 TABLET | Refills: 0 | Status: SHIPPED | OUTPATIENT
Start: 2023-10-13 | End: 2023-10-28

## 2023-10-13 NOTE — TELEPHONE ENCOUNTER
Sending a prednisone taper the patient needs to come in for an appointment if this is not helpful.  I would continue to use the topicals with the steroid taper

## 2023-10-13 NOTE — TELEPHONE ENCOUNTER
Caller: Trish Daley    Relationship: Self    Best call back number: [phone] (Work)     What medication are you requesting: ANOTHER MED FOR POISON IVY     What are your current symptoms:     How long have you been experiencing symptoms: HAD AN APPT WITH DR CHURCH ON 10TH BUT TOPICAL CREAM ISN'T HELPING       Have you had these symptoms before:    [x] Yes  [] No    Have you been treated for these symptoms before:   [x] Yes  [] No    If a prescription is needed, what is your preferred pharmacy and phone number: [id number] - [address] [facility] [address] [facility] [address] - [phone]  - [phone] FX     Additional notes:

## 2023-10-15 NOTE — ASSESSMENT & PLAN NOTE
Patient's (Body mass index is 38.65 kg/mý.) indicates that they are obese (BMI >30) with health conditions that include none . Weight is unchanged. BMI  is above average; BMI management plan is completed. We discussed portion control and increasing exercise.

## 2024-08-01 ENCOUNTER — HOSPITAL ENCOUNTER (EMERGENCY)
Facility: HOSPITAL | Age: 59
Discharge: HOME | End: 2024-08-01
Attending: EMERGENCY MEDICINE
Payer: COMMERCIAL

## 2024-08-01 ENCOUNTER — APPOINTMENT (OUTPATIENT)
Dept: CARDIOLOGY | Facility: HOSPITAL | Age: 59
End: 2024-08-01
Payer: COMMERCIAL

## 2024-08-01 ENCOUNTER — TELEPHONE (OUTPATIENT)
Dept: FAMILY MEDICINE CLINIC | Facility: CLINIC | Age: 59
End: 2024-08-01

## 2024-08-01 VITALS
RESPIRATION RATE: 18 BRPM | TEMPERATURE: 98 F | HEIGHT: 63 IN | OXYGEN SATURATION: 97 % | HEART RATE: 68 BPM | BODY MASS INDEX: 37.89 KG/M2 | DIASTOLIC BLOOD PRESSURE: 78 MMHG | WEIGHT: 213.85 LBS | SYSTOLIC BLOOD PRESSURE: 148 MMHG

## 2024-08-01 DIAGNOSIS — M79.604 PAIN OF RIGHT LOWER EXTREMITY: Primary | ICD-10-CM

## 2024-08-01 LAB
BH CV LOWER VASCULAR LEFT COMMON FEMORAL AUGMENT: NORMAL
BH CV LOWER VASCULAR LEFT COMMON FEMORAL COMPETENT: NORMAL
BH CV LOWER VASCULAR LEFT COMMON FEMORAL COMPRESS: NORMAL
BH CV LOWER VASCULAR LEFT COMMON FEMORAL PHASIC: NORMAL
BH CV LOWER VASCULAR LEFT COMMON FEMORAL SPONT: NORMAL
BH CV LOWER VASCULAR RIGHT COMMON FEMORAL AUGMENT: NORMAL
BH CV LOWER VASCULAR RIGHT COMMON FEMORAL COMPETENT: NORMAL
BH CV LOWER VASCULAR RIGHT COMMON FEMORAL COMPRESS: NORMAL
BH CV LOWER VASCULAR RIGHT COMMON FEMORAL PHASIC: NORMAL
BH CV LOWER VASCULAR RIGHT COMMON FEMORAL SPONT: NORMAL
BH CV LOWER VASCULAR RIGHT DISTAL FEMORAL COMPRESS: NORMAL
BH CV LOWER VASCULAR RIGHT GASTRONEMIUS COMPRESS: NORMAL
BH CV LOWER VASCULAR RIGHT GREATER SAPH AK COMPRESS: NORMAL
BH CV LOWER VASCULAR RIGHT GREATER SAPH BK COMPRESS: NORMAL
BH CV LOWER VASCULAR RIGHT LESSER SAPH COMPRESS: NORMAL
BH CV LOWER VASCULAR RIGHT MID FEMORAL AUGMENT: NORMAL
BH CV LOWER VASCULAR RIGHT MID FEMORAL COMPETENT: NORMAL
BH CV LOWER VASCULAR RIGHT MID FEMORAL COMPRESS: NORMAL
BH CV LOWER VASCULAR RIGHT MID FEMORAL PHASIC: NORMAL
BH CV LOWER VASCULAR RIGHT MID FEMORAL SPONT: NORMAL
BH CV LOWER VASCULAR RIGHT PERONEAL COMPRESS: NORMAL
BH CV LOWER VASCULAR RIGHT POPLITEAL AUGMENT: NORMAL
BH CV LOWER VASCULAR RIGHT POPLITEAL COMPETENT: NORMAL
BH CV LOWER VASCULAR RIGHT POPLITEAL COMPRESS: NORMAL
BH CV LOWER VASCULAR RIGHT POPLITEAL PHASIC: NORMAL
BH CV LOWER VASCULAR RIGHT POPLITEAL SPONT: NORMAL
BH CV LOWER VASCULAR RIGHT POSTERIOR TIBIAL COMPRESS: NORMAL
BH CV LOWER VASCULAR RIGHT PROXIMAL FEMORAL COMPRESS: NORMAL
BH CV LOWER VASCULAR RIGHT SAPHENOFEMORAL JUNCTION COMPRESS: NORMAL
BH CV VAS PRELIMINARY FINDINGS SCRIPTING: 1
CK SERPL-CCNC: 114 U/L (ref 20–180)

## 2024-08-01 PROCEDURE — 36415 COLL VENOUS BLD VENIPUNCTURE: CPT

## 2024-08-01 PROCEDURE — 93971 EXTREMITY STUDY: CPT | Performed by: SURGERY

## 2024-08-01 PROCEDURE — 82550 ASSAY OF CK (CPK): CPT | Performed by: EMERGENCY MEDICINE

## 2024-08-01 PROCEDURE — 99284 EMERGENCY DEPT VISIT MOD MDM: CPT

## 2024-08-01 PROCEDURE — 93971 EXTREMITY STUDY: CPT

## 2024-08-01 RX ORDER — METHOCARBAMOL 500 MG/1
500 TABLET, FILM COATED ORAL 3 TIMES DAILY
Qty: 30 TABLET | Refills: 0 | Status: SHIPPED | OUTPATIENT
Start: 2024-08-01

## 2024-08-01 NOTE — TELEPHONE ENCOUNTER
Caller: Trish Daley    Relationship: Self    Best call back number: [phone]     What orders are you requesting (i.e. lab or imaging): MRI OF RIGHT LEG    Where will you receive your lab/imaging services: PROVIDERS CHOICE    Additional notes: PATIENT STATED THAT HER LEG IS SWOLLEN AND ITS DIFFICULT TO WALK ON. PATIENT IS HAVING PAIN FROM MID THIGH TO JUST BELOW THE KNEE. PATIENT SAID THE LEG IS NOT RED OR HOT TO THE TOUCH    PLEASE ADVISE

## 2024-08-01 NOTE — DISCHARGE INSTRUCTIONS
Follow-up with your orthopedist as scheduled.  Return to the ER for worsening symptoms signs or concerns.

## 2024-08-01 NOTE — ED PROVIDER NOTES
Subjective   History of Present Illness  Chief complaint: Patient is a 59-year-old who has pain in her right calf and thigh.  It also hurts behind her knee.  She states this has happened a few times over the last year.  She has had x-rays as well obtained.  Typically goes away more quickly than this.  It started on Sunday.  It happens when she does a lot of bending and standing and she had done that over the course of the few days prior.  On Sunday was very hard to even ambulate.  That improved and she was able to take her trip to [place].  She returns today and called her primary physician to ask for an MRI for this recurrent pain.  When she told her that she did have some pain and some swelling to the calf she was asked to come in for a Doppler study.  She denies chest pain.  She denies shortness of breath.  She has not had fever.  She was told she had arthritis on her x-rays that she had a few months ago for the same problem.  She has no numbness or weakness.  Context:    Duration:    Timing:    Severity:    Associated Symptoms:        PCP:  LMP:      Review of Systems   Constitutional:  Negative for fever.   Respiratory: Negative.     Cardiovascular: Negative.    Musculoskeletal:  Positive for arthralgias and myalgias.   Skin: Negative.    Neurological:  Negative for weakness and numbness.       Past Medical History:   Diagnosis Date    Allergic     Dust, Mold, Bell Peppers, percocet    Arthritis     knees, hands, shoulder, neck    Asthma     Cancer     uterus    Depression     Headache     weather    Tremor     Hand jerks when trying to hold it still.  Have to brace it.       Allergies   Allergen Reactions    Cantaloupe (Diagnostic) Other (See Comments)     Ear inflamation    Codeine Arrhythmia    Cucumber Extract Other (See Comments)     Cucumbers earache    Oxycodone-Acetaminophen Arrhythmia    Pepper Other (See Comments)     Bell peppers/inflamation of ears and bowel system    Watermelon [Citrullus  Vulgaris] Other (See Comments)     earache       Past Surgical History:   Procedure Laterality Date    ENDOMETRIAL ABLATION      remove 2001    FRACTURE SURGERY  2004    left wrist    HERNIA REPAIR      HYSTERECTOMY  2001       Family History   Problem Relation Age of Onset    Hypertension Mother     COPD Mother     Other Father         Diverticulitis    Cancer Father     Hypertension Father     COPD Father     Arthritis Father     Vision loss Father     Hypertension Sister     Heart disease Sister     Other Sister         scoliosis    Hypertension Daughter     Hypertension Son     Asthma Paternal Aunt     Hypertension Son     Other Son        Social History     Socioeconomic History    Marital status:    Tobacco Use    Smoking status: Never     Passive exposure: Past (father smoked as pt was growing up)    Smokeless tobacco: Never   Vaping Use    Vaping status: Never Used   Substance and Sexual Activity    Alcohol use: Yes     Alcohol/week: 3.0 standard drinks of alcohol     Types: 1 Glasses of wine, 1 Cans of beer, 1 Drinks containing 0.5 oz of alcohol per week     Comment: 1 glass of wine weekly    Drug use: Never    Sexual activity: Yes     Partners: Male     Birth control/protection: None, Hysterectomy     Comment: monogamous with            Objective   Physical Exam  Vitals and nursing note reviewed.   Constitutional:       Appearance: Normal appearance.   HENT:      Head: Normocephalic and atraumatic.   Musculoskeletal:        Legs:       Comments: Patient has tenderness in these areas.  She is neurovascular intact distally.  Soft compartments.  No signs of pitting edema.  No significant asymmetry to the legs.   Skin:     General: Skin is warm and dry.   Neurological:      Mental Status: She is alert.         Procedures           ED Course                                 Results for orders placed or performed during the hospital encounter of 08/01/24   CK    Specimen: Blood   Result Value Ref  Range    Creatine Kinase 114 20 - 180 U/L   Duplex Venous Lower Extremity - Right   Result Value Ref Range    Right Common Femoral Spont Y     Right Common Femoral Competent Y     Right Common Femoral Phasic Y     Right Common Femoral Compress C     Right Common Femoral Augment Y     Right Saphenofemoral Junction Compress C     Right Proximal Femoral Compress C     Right Mid Femoral Spont Y     Right Mid Femoral Competent Y     Right Mid Femoral Phasic Y     Right Mid Femoral Compress C     Right Mid Femoral Augment Y     Right Distal Femoral Compress C     Right Popliteal Spont Y     Right Popliteal Competent Y     Right Popliteal Phasic Y     Right Popliteal Compress C     Right Popliteal Augment Y     Right Posterior Tibial Compress C     Right Peroneal Compress C     Right Gastronemius Compress C     Right Greater Saph AK Compress C     Right Greater Saph BK Compress C     Right Lesser Saph Compress C     Left Common Femoral Spont Y     Left Common Femoral Competent Y     Left Common Femoral Phasic Y     Left Common Femoral Compress C     Left Common Femoral Augment Y     BH CV VAS PRELIMINARY FINDINGS SCRIPTING 1.0                  Medical Decision Making  Patient was seen and evaluated for the above problem    Differential diagnosis includes but is not limited to rhabdomyolysis, exacerbation of chronic pain, DVT    Patient with risk factors for DVT with recent travel and pain into the deep venous system.  Doppler was obtained and no signs of DVT were noted.  No signs of rhabdomyolysis.  Patient has had x-rays already in the past.  She will follow-up outpatient will return for worsening symptoms signs or concerns of.  She verbalized understanding is okay with this.    Amount and/or Complexity of Data Reviewed  Labs: ordered. Decision-making details documented in ED Course.     Details: Labs reviewed by myself    Risk  Prescription drug management.        Final diagnoses:   None     Right leg pain  ED  Disposition  ED Disposition       None            No follow-up provider specified.       Medication List      No changes were made to your prescriptions during this visit.            Edilberto Martins,   08/01/24 1721       Edilberto Martins,   08/01/24 1735

## 2024-08-01 NOTE — TELEPHONE ENCOUNTER
Spoke to pt 08/01/2024, 2:30 pm advised pt per Dr. Collins she strongly suggests she be evaluated in ER as this could be the beginning of a DVT.  They would be able to do more testing, as we would have to do pre-cert for certain tests which can take up to 24 to 48 hours for approval

## 2024-08-06 ENCOUNTER — TELEPHONE (OUTPATIENT)
Dept: PEDIATRICS | Facility: OTHER | Age: 59
End: 2024-08-06
Payer: COMMERCIAL

## 2024-08-06 NOTE — TELEPHONE ENCOUNTER
Caller: Trish Daley    Relationship: Self    Best call back number: [phone]     What is the best time to reach you: ANY    Who are you requesting to speak with (clinical staff, provider,  specific staff member): CLINICAL    Do you know the name of the person who called: TRISH    What was the call regarding:   PATIENT HAS APPOINTMENT WITH ORTHOPEDIC  SURGEON ON 08/15. SHE WOULD LIKE AN ORDER FOR MRI OF RIGHT LEG AND BACK PRIOR TO APPOINTMENT. PLEASE CALL PATIENT WHEN ORDER SENT

## 2024-08-06 NOTE — TELEPHONE ENCOUNTER
Called and spoke to olvin and let her know  will not be able to order the mri because she has not seen her in over a year unfortunately. I asked her who referred her to the ortho and patient states she referred herself and just wanted  to order the mri to hopefully have them done before she sees ortho so she could be out of pain quicker. I did apologize. I told her I see she is seeing  and he is very excellent and will definitely be able to take care of her. She appreciated the confirmation and will wait to see him

## 2024-08-15 ENCOUNTER — OFFICE VISIT (OUTPATIENT)
Dept: ORTHOPEDIC SURGERY | Facility: CLINIC | Age: 59
End: 2024-08-15
Payer: COMMERCIAL

## 2024-08-15 VITALS — WEIGHT: 215 LBS | HEIGHT: 63 IN | OXYGEN SATURATION: 96 % | HEART RATE: 74 BPM | BODY MASS INDEX: 38.09 KG/M2

## 2024-08-15 DIAGNOSIS — M17.11 PRIMARY OSTEOARTHRITIS OF RIGHT KNEE: Primary | ICD-10-CM

## 2024-08-15 DIAGNOSIS — M25.561 ACUTE PAIN OF RIGHT KNEE: ICD-10-CM

## 2024-08-15 RX ORDER — CHLORHEXIDINE GLUCONATE 500 MG/1
CLOTH TOPICAL ONCE
OUTPATIENT
Start: 2024-08-15

## 2024-08-15 RX ORDER — MELOXICAM 7.5 MG/1
15 TABLET ORAL ONCE
OUTPATIENT
Start: 2024-08-15 | End: 2024-08-15

## 2024-08-15 RX ORDER — PREGABALIN 150 MG/1
150 CAPSULE ORAL ONCE
OUTPATIENT
Start: 2024-08-15 | End: 2024-08-15

## 2024-08-15 NOTE — PROGRESS NOTES
Subjective:     Patient ID: Trish Daley is a 59 y.o. female.    Chief Complaint:    History of Present Illness  Trish Daley presents to clinic today for evaluation of right knee pain.  The patient states knee pain has been ongoing for quite some time and she has exhausted conservative treatments consisting of physical therapy oral NSAIDs topical medications low-impact exercise and weight loss.  The patient states that the pain is now severe and debilitating and located predominantly anterior medially and posteriorly in her knee.  She states that is worse with activity and better with rest.  She states she is tired of waking up and not knowing if she is going to be able to walk.  She would like to be considered for right total knee arthroplasty.     Social History     Occupational History    Not on file   Tobacco Use    Smoking status: Never     Passive exposure: Past (father smoked as pt was growing up)    Smokeless tobacco: Never   Vaping Use    Vaping status: Never Used   Substance and Sexual Activity    Alcohol use: Yes     Alcohol/week: 1.0 standard drink of alcohol     Types: 1 Drinks containing 0.5 oz of alcohol per week     Comment: 1 glass of wine weekly    Drug use: Never    Sexual activity: Yes     Partners: Male     Birth control/protection: None, Hysterectomy     Comment: monogamous with       Past Medical History:   Diagnosis Date    Allergic     Dust, Mold, Bell Peppers, percocet    Arthritis     knees, hands, shoulder, neck    Arthritis of back     Arthritis of neck     Asthma     Cancer     uterus    Cervical disc disorder     Depression     Headache     weather    Knee swelling     Rotator cuff syndrome     Tear of meniscus of knee     Thoracic disc disorder     Tremor     Hand jerks when trying to hold it still.  Have to brace it.     Past Surgical History:   Procedure Laterality Date    ENDOMETRIAL ABLATION      remove 2001    FRACTURE SURGERY  2004    left wrist    HERNIA REPAIR       "HYSTERECTOMY         Family History   Problem Relation Age of Onset    Hypertension Mother     COPD Mother     Osteoporosis Mother     Cancer Father     Hypertension Father     COPD Father     Arthritis Father     Vision loss Father     Hypertension Sister     Heart disease Sister     Scoliosis Sister     Hypertension Daughter     Hypertension Son     Asthma Paternal Aunt     Hypertension Son     Broken bones Sister     Dislocations Sister                  Objective:  Vitals:    08/15/24 0816   Pulse: 74   SpO2: 96%   Weight: 97.5 kg (215 lb)   Height: 160 cm (63\")         08/15/24  0816   Weight: 97.5 kg (215 lb)     Body mass index is 38.09 kg/m².        Right Knee Exam     Tenderness   The patient is experiencing tenderness in the medial retinaculum and medial joint line.    Range of Motion   Extension:  0   Flexion:  110     Tests   Marvin:  Medial - positive   Varus: negative Valgus: negative  Lachman:  Anterior - negative    Posterior - negative  Drawer:  Anterior - negative    Posterior - negative    Other   Erythema: absent  Scars: absent  Sensation: normal  Pulse: present  Swelling: mild  Effusion: no effusion present               Imagin views of the right knee were ordered and reviewed by myself in the office today  Indication: right knee pain  Findings: X-rays demonstrate no acute osseous abnormality.  There is no signs of fracture dislocation or subluxation.  There is joint space narrowing, subchondral sclerosis, cystic changes, and periarticular osteophytes most pronounced in the Medial joint.  Comparative studies: None      Assessment:        1. Primary osteoarthritis of right knee    2. Acute pain of right knee           Plan:        She has failed conservative management of right knee osteoarthritis .  We discussed further conservative treatments including but not limited to physical therapy, intra-articular injections, nonsteroidal anti-inflammatories, topical creams, use of an assistive " device, weight loss, and low impact exercises.  Shevoiced understanding of further nonoperative treatment options but She is interested in proceeding with knee replacement surgery.    The spectrum of treatment options were discussed with the patient in detail including both the nonoperative and operative treatment modalities and their respective risks and benefits.  After thorough discussion, the patient has elected to undergo surgical treatment.  The details of the surgical procedure were explained including the location of probable incisions and a description of the likely implants to be used.  Models and diagrams were used as educational resources. The patient understands the likely convalescence after surgery, as well as the rehabilitation required.  We thoroughly discussed the risks, benefits, and alternatives to surgery.  The risks include but are not limited to the risk of infection, joint stiffness, blood clots (including DVT and/or pulmonary embolus along with the risk of death), neurologic and/or vascular injury, fracture, dislocation, nonunion, malunion, need for further surgery including hardware failure requiring revision, and continued pain.  It was explained that if tissue has been repaired or reconstructed, there is also a chance of failure which may require further management.  Following the completion of the discussion, the patient expressed understanding of this planned course of care, all their questions were answered and consent will be obtained preoperatively.  I also reviewed the typical postoperative recovery of 6-12 months before maximal recovery, and possible need for rehabilitation stay after hospitalization. I also explained that in some series of patients in the research literature, up to 20% of patients are dissatisfied with their total knee arthroplasty. I also discussed the risks of of anesthesia. I also explained that she would meet with Anesthesiology preoperatively to discuss  anesthetic risk.  All questions were answered.     Plan for right total knee arthroplasty.    Implants: Mccl[employer] and Nephew cemented Legion TKS with [employer]  Anticoagulation: Asprin  Antibiotics: Cefazolin  Admission Type: Same Day  TXA: Yes        Trish Daley was in agreement with plan and had all questions answered.     Medications:  No orders of the defined types were placed in this encounter.      Followup:  No follow-ups on file.    Diagnoses and all orders for this visit:    1. Primary osteoarthritis of right knee (Primary)  -     Case Request; Standing  -     CBC and Differential; Future  -     Basic metabolic panel; Future  -     Hemoglobin A1c; Future  -     Type and screen; Future  -     Urinalysis With Culture If Indicated -; Future  -     lactated ringers bolus 500 mL  -     Tranexamic Acid 1,000 mg in sodium chloride 0.9 % 100 mL  -     Tranexamic Acid 1,000 mg in sodium chloride 0.9 % 100 mL  -     Chlorhexidine Gluconate Cloth 2 % pads  -     ethyl alcohol 62 % 2 each  -     ceFAZolin (ANCEF) 2 g in sodium chloride 0.9 % 100 mL IVPB  -     meloxicam (MOBIC) tablet 15 mg  -     pregabalin (LYRICA) capsule 150 mg  -     Case Request    2. Acute pain of right knee  -     XR Knee 4+ View Right    Other orders  -     Follow Anesthesia Guidelines / Protocol; Future  -     Follow Anesthesia Guidelines / Protocol; Standing  -     Verify NPO Status; Standing  -     SCD (sequential compression device)- to be placed on patient in Pre-op; Standing  -     Clip operative site; Standing  -     Obtain informed consent (if not collected inpatient or PAT); Standing  -     Care Order / Instruction for all Female Patients: Clean Catch Urinalysis with culture if indicated if patient symptomatic: dysuria, flank or lower abdominal pain, burning, urgency or frequency  -     Nerve Block; Standing          Dictated utilizing Dragon dictation

## 2024-08-26 PROBLEM — M17.11 PRIMARY OSTEOARTHRITIS OF RIGHT KNEE: Status: ACTIVE | Noted: 2024-08-15

## 2024-08-30 ENCOUNTER — TELEPHONE (OUTPATIENT)
Dept: FAMILY MEDICINE CLINIC | Facility: CLINIC | Age: 59
End: 2024-08-30

## 2024-08-30 ENCOUNTER — OFFICE VISIT (OUTPATIENT)
Dept: FAMILY MEDICINE CLINIC | Facility: CLINIC | Age: 59
End: 2024-08-30
Payer: COMMERCIAL

## 2024-08-30 VITALS
HEIGHT: 63 IN | OXYGEN SATURATION: 98 % | WEIGHT: 214.4 LBS | BODY MASS INDEX: 37.99 KG/M2 | RESPIRATION RATE: 16 BRPM | HEART RATE: 68 BPM | TEMPERATURE: 98.1 F | SYSTOLIC BLOOD PRESSURE: 134 MMHG | DIASTOLIC BLOOD PRESSURE: 68 MMHG

## 2024-08-30 DIAGNOSIS — J45.20 MILD INTERMITTENT ASTHMA WITHOUT COMPLICATION: ICD-10-CM

## 2024-08-30 DIAGNOSIS — E66.09 CLASS 2 OBESITY DUE TO EXCESS CALORIES WITHOUT SERIOUS COMORBIDITY WITH BODY MASS INDEX (BMI) OF 37.0 TO 37.9 IN ADULT: ICD-10-CM

## 2024-08-30 DIAGNOSIS — Z01.818 PRE-OP TESTING: Primary | ICD-10-CM

## 2024-08-30 RX ORDER — ALBUTEROL SULFATE 90 UG/1
2 AEROSOL, METERED RESPIRATORY (INHALATION) EVERY 6 HOURS PRN
Qty: 18 G | Refills: 3 | Status: SHIPPED | OUTPATIENT
Start: 2024-08-30

## 2024-08-30 NOTE — LETTER
August 30, 2024       No Recipients    Patient: Trish Daley   YOB: 1965   Date of Visit: 8/30/2024     Dear Flor Bynum MA:       Trish Daley is a patient of mine with an upcoming scheduled knee replacement. ECG in office was normal and she has upcoming labs already ordered by Orthopedic surgery. Calculated NSQIP risk for patient and she was below average risk for complications. She is cleared from a PCP standpoint for surgery.         Sincerely,        Niki Collins DO

## 2024-08-30 NOTE — PROGRESS NOTES
Subjective   Trish Daley is a 59 y.o. female.   Chief Complaint   Patient presents with    Pre-op Exam     Total right knee replacement        History of Present Illness     Pre-op clearance  -Pt presents today for surgery clearance:  Total right knee replacement  -Surgery scheduled for surgery 10/24/2024 at [facility]  -Surgeon is already ordered CBC, BMP, A1c, type and screen and urinalysis  -Do not see a recent ECG      Intermittent asthma  -Requesting refill of albuterol inhaler      Parking placard  -Patient states there are some days that she can walk and other days that she cannot walk due to pain  -Requesting temporary handicap parking tag        Preventative  - Offered Prevnar 20, Shingrix vaccines, but pt declines  - Pt will contact insurance for coverage of Tdap  - Offered mammogram, pt requests to postpone till after knee surgery      The following portions of the patient's history were reviewed and updated as appropriate: allergies, current medications, past family history, past medical history, past social history, past surgical history, and problem list.    Patient Active Problem List   Diagnosis    Class 2 obesity due to excess calories without serious comorbidity with body mass index (BMI) of 37.0 to 37.9 in adult    Mild intermittent asthma without complication    Primary osteoarthritis of right knee       Current Outpatient Medications on File Prior to Visit   Medication Sig Dispense Refill    diphenhydrAMINE-acetaminophen (TYLENOL PM)  MG tablet per tablet Take 1 tablet by mouth At Night As Needed for Sleep.      Turmeric (QC TUMERIC COMPLEX PO) Take  by mouth.      vitamin D3 125 MCG (5000 UT) capsule capsule       [DISCONTINUED] albuterol sulfate  (90 Base) MCG/ACT inhaler Inhale 2 puffs.      [DISCONTINUED] methocarbamol (ROBAXIN) 500 MG tablet Take 1 tablet by mouth 3 (Three) Times a Day. 30 tablet 0     No current facility-administered medications on file prior to visit.     Current  "outpatient and discharge medications have been reconciled for the patient.  Reviewed by: Niki Collins DO      Allergies   Allergen Reactions    Cantaloupe (Diagnostic) Other (See Comments)     Ear inflamation    Codeine Arrhythmia    Cucumber Extract Other (See Comments)     Cucumbers earache    Oxycodone-Acetaminophen Arrhythmia    Pepper Other (See Comments)     Bell peppers/inflamation of ears and bowel system    Watermelon [Citrullus Vulgaris] Other (See Comments)     earache         Objective   Visit Vitals  /68 (BP Location: Left arm, Patient Position: Sitting, Cuff Size: Large Adult)   Pulse 68   Temp 98.1 °F (36.7 °C) (Skin)   Resp 16   Ht 160 cm (63\")   Wt 97.3 kg (214 lb 6.4 oz)   SpO2 98%   BMI 37.98 kg/m²       Physical Exam  HENT:      Head: Normocephalic and atraumatic.   Eyes:      Conjunctiva/sclera: Conjunctivae normal.   Neurological:      General: No focal deficit present.      Mental Status: She is alert and oriented to person, place, and time.   Psychiatric:         Mood and Affect: Mood normal.         Behavior: Behavior normal.           ECG 12 Lead    Date/Time: 8/30/2024 9:53 AM  Performed by: Niki Collins DO    Authorized by: Niki Collins DO  Previous ECG: no previous ECG available  Rhythm: sinus rhythm  Rate: normal  ST Segments: ST segments normal  T Waves: T waves normal  QRS axis: normal  Other findings comments: Possible left atrial enlargment    Clinical impression: normal ECG            Diagnoses and all orders for this visit:    1. Pre-op testing (Primary)  -     ECG 12 Lead: Possible left atrial enlargement, otherwise normal ECG  -Ran patient's NSQIP score and discussed with her that she has below average for complications after surgery  - Will write her a letter for clearance for surgery    -Told her I would give her handicap parking placard.  She just needs to bring us the form from the BM and I can fill it out.    2. Mild intermittent asthma without " complication  -     albuterol sulfate  (90 Base) MCG/ACT inhaler; Inhale 2 puffs Every 6 (Six) Hours As Needed for Wheezing.  Dispense: 18 g; Refill: 3    3. Class 2 obesity due to excess calories without serious comorbidity with body mass index (BMI) of 37.0 to 37.9 in adult               Follow Up  - February (to give patient a little bit of a break as she will be following up with surgery and doing physical therapy) for annual physical exam    Expected course, medications, and adverse effects discussed as appropriate.  Call or return if worsening or persistent symptoms. Hand hygiene was performed before donning protective equipment and after removal when leaving the room.    This document is intended for medical expert use only. Reading of this document by patients and/or patient's family without participating medical staff guidance may result in misinterpretation and unintended morbidity. Any interpretation of such data is the responsibility of the patient and/or family member responsible for the patient in concert with their primary or specialist providers, not to be left for sources of online searches such as [employer], [employer] or similar queries. Relying on these approaches to knowledge may result in misinterpretation, misguided goals of care and even death should patients or family members try recommendations outside of the realm of professional medical care.

## 2024-08-30 NOTE — TELEPHONE ENCOUNTER
Creating letter for clearance from a PCP standpoint for patient surgery. To be faxed to ortho office

## 2024-10-16 ENCOUNTER — LAB (OUTPATIENT)
Dept: LAB | Facility: HOSPITAL | Age: 59
End: 2024-10-16
Payer: COMMERCIAL

## 2024-10-16 ENCOUNTER — PRE-ADMISSION TESTING (OUTPATIENT)
Dept: PREADMISSION TESTING | Facility: HOSPITAL | Age: 59
End: 2024-10-16
Payer: COMMERCIAL

## 2024-10-16 VITALS
DIASTOLIC BLOOD PRESSURE: 88 MMHG | HEIGHT: 64 IN | TEMPERATURE: 97.2 F | BODY MASS INDEX: 36.64 KG/M2 | OXYGEN SATURATION: 98 % | SYSTOLIC BLOOD PRESSURE: 161 MMHG | WEIGHT: 214.6 LBS | RESPIRATION RATE: 16 BRPM | HEART RATE: 82 BPM

## 2024-10-16 DIAGNOSIS — M17.11 PRIMARY OSTEOARTHRITIS OF RIGHT KNEE: ICD-10-CM

## 2024-10-16 LAB
ABO GROUP BLD: NORMAL
ANION GAP SERPL CALCULATED.3IONS-SCNC: 13.6 MMOL/L (ref 5–15)
BASOPHILS # BLD AUTO: 0.04 10*3/MM3 (ref 0–0.2)
BASOPHILS NFR BLD AUTO: 0.7 % (ref 0–1.5)
BILIRUB UR QL STRIP: NEGATIVE
BLD GP AB SCN SERPL QL: NEGATIVE
BUN SERPL-MCNC: 13 MG/DL (ref 6–20)
BUN/CREAT SERPL: 19.1 (ref 7–25)
CALCIUM SPEC-SCNC: 9.4 MG/DL (ref 8.6–10.5)
CHLORIDE SERPL-SCNC: 103 MMOL/L (ref 98–107)
CLARITY UR: CLEAR
CO2 SERPL-SCNC: 25.4 MMOL/L (ref 22–29)
COLOR UR: YELLOW
CREAT SERPL-MCNC: 0.68 MG/DL (ref 0.57–1)
DEPRECATED RDW RBC AUTO: 45.2 FL (ref 37–54)
EGFRCR SERPLBLD CKD-EPI 2021: 100.5 ML/MIN/1.73
EOSINOPHIL # BLD AUTO: 0.11 10*3/MM3 (ref 0–0.4)
EOSINOPHIL NFR BLD AUTO: 1.8 % (ref 0.3–6.2)
ERYTHROCYTE [DISTWIDTH] IN BLOOD BY AUTOMATED COUNT: 13.2 % (ref 12.3–15.4)
GLUCOSE SERPL-MCNC: 119 MG/DL (ref 65–99)
GLUCOSE UR STRIP-MCNC: NEGATIVE MG/DL
HBA1C MFR BLD: 6 % (ref 4.8–5.6)
HCT VFR BLD AUTO: 43.4 % (ref 34–46.6)
HGB BLD-MCNC: 13.7 G/DL (ref 12–15.9)
HGB UR QL STRIP.AUTO: NEGATIVE
HOLD SPECIMEN: NORMAL
IMM GRANULOCYTES # BLD AUTO: 0.06 10*3/MM3 (ref 0–0.05)
IMM GRANULOCYTES NFR BLD AUTO: 1 % (ref 0–0.5)
INR PPP: 0.99 (ref 0.93–1.1)
KETONES UR QL STRIP: NEGATIVE
LEUKOCYTE ESTERASE UR QL STRIP.AUTO: NEGATIVE
LYMPHOCYTES # BLD AUTO: 2.37 10*3/MM3 (ref 0.7–3.1)
LYMPHOCYTES NFR BLD AUTO: 38.5 % (ref 19.6–45.3)
MCH RBC QN AUTO: 29 PG (ref 26.6–33)
MCHC RBC AUTO-ENTMCNC: 31.6 G/DL (ref 31.5–35.7)
MCV RBC AUTO: 91.8 FL (ref 79–97)
MONOCYTES # BLD AUTO: 0.38 10*3/MM3 (ref 0.1–0.9)
MONOCYTES NFR BLD AUTO: 6.2 % (ref 5–12)
MRSA DNA SPEC QL NAA+PROBE: NORMAL
NEUTROPHILS NFR BLD AUTO: 3.19 10*3/MM3 (ref 1.7–7)
NEUTROPHILS NFR BLD AUTO: 51.8 % (ref 42.7–76)
NITRITE UR QL STRIP: NEGATIVE
NRBC BLD AUTO-RTO: 0 /100 WBC (ref 0–0.2)
PH UR STRIP.AUTO: 6.5 [PH] (ref 5–8)
PLATELET # BLD AUTO: 339 10*3/MM3 (ref 140–450)
PMV BLD AUTO: 10.7 FL (ref 6–12)
POTASSIUM SERPL-SCNC: 4 MMOL/L (ref 3.5–5.2)
PROT UR QL STRIP: NEGATIVE
PROTHROMBIN TIME: 10.8 SECONDS (ref 9.6–11.7)
RBC # BLD AUTO: 4.73 10*6/MM3 (ref 3.77–5.28)
RH BLD: POSITIVE
SODIUM SERPL-SCNC: 142 MMOL/L (ref 136–145)
SP GR UR STRIP: 1.02 (ref 1–1.03)
T&S EXPIRATION DATE: NORMAL
UROBILINOGEN UR QL STRIP: NORMAL
WBC NRBC COR # BLD AUTO: 6.15 10*3/MM3 (ref 3.4–10.8)

## 2024-10-16 PROCEDURE — 86900 BLOOD TYPING SEROLOGIC ABO: CPT

## 2024-10-16 PROCEDURE — 85610 PROTHROMBIN TIME: CPT

## 2024-10-16 PROCEDURE — 86850 RBC ANTIBODY SCREEN: CPT

## 2024-10-16 PROCEDURE — 83036 HEMOGLOBIN GLYCOSYLATED A1C: CPT

## 2024-10-16 PROCEDURE — 85025 COMPLETE CBC W/AUTO DIFF WBC: CPT

## 2024-10-16 PROCEDURE — 80048 BASIC METABOLIC PNL TOTAL CA: CPT

## 2024-10-16 PROCEDURE — 36415 COLL VENOUS BLD VENIPUNCTURE: CPT

## 2024-10-16 PROCEDURE — 86901 BLOOD TYPING SEROLOGIC RH(D): CPT

## 2024-10-16 PROCEDURE — 87641 MR-STAPH DNA AMP PROBE: CPT

## 2024-10-16 PROCEDURE — 81003 URINALYSIS AUTO W/O SCOPE: CPT

## 2024-10-23 NOTE — DISCHARGE INSTRUCTIONS
Total Knee Joint Replacement Discharge Instructions:    I. ACTIVITIES:  1. Exercises:  Complete exercise program as taught by the hospital physical therapist 2 times per day  Exercise program will be advanced by the physical therapist  During the day be up ambulating every 2 hours (while awake) for short distances  Complete the ankle pump exercises at least 10 times per hour (while awake)  Elevate legs most of the day the first week post operatively and thereafter elevate legs when in bed and for at least 30 minutes during the day. Caution must be taken to avoid pillow placement under the bend of the knee as this can led to flexion contractures of the knee.  Use cold packs 20-30 minutes approximately 5 times per day. This should be done before and after completing your exercises and at any time you are experiencing pain/ stiffness in your operative extremity.      2. Activities of Daily Living:  No tub baths, hot tubs, or swimming pools for 4 weeks  May shower and let water run over the incision on post-operative day #5 if no drainage. Do not scrub or rub the incision. Simply let the water run over the incision and pat dry.    II. Restrictions  Do not cross legs or kneel  Your surgeon will discuss with you when you will be able to drive again.  Weight bearing is as tolerated  First week stay inside on even terrain. May go up and down stairs one stair at a time utilizing the hand rail  After one week, you may venture outside.    III. Precautions:  Everyone that comes near you should wash their hands  No elective dental, genital-urinary, or colon procedures or surgical procedures for 12 weeks after surgery unless absolutely necessary.   If dental work or surgical procedure is deemed absolutely necessary, you will need to contact your surgeon as you will need to take antibiotics 1 hour prior to any dental work (including teeth cleanings).  Please discuss with your surgeon prophylactic antibiotics as the length of time  this intervention will be necessary for you varies with each patient’s health history and situation.  Avoid sick people. If you must be around someone who is ill, they should wear a mask.  Avoid visits to the Emergency Room or Urgent Care unless you are having a life threatening event.   If ordered stockings are to be placed on in the morning and removed at night. Monitor the stockings to ensure that any swelling is not causing the stockings to become too tight. In this case, remove stockings immediately.    IV. INCISION CARE:  May remove the Ace wrap 2 days following surgery  The negative pressure dressing with the battery pack is waterproof and should remain in place for 7 days.  You may shower with the negative pressure dressing as it is waterproof  Following removal of the dressing on day 7 you may shower and allow water to run over the incision  No submersion of incision in water such as tubs pools hot tubs etc.  No creams or ointments to the incision  Do not touch or pick at the incision  Check incision/dressing every day and notify surgeon immediately if any of the following signs or symptoms are noted:  Increase in redness  Increase in swelling around the incision and of the entire extremity  Increase in pain  Drainage oozing from the incision  Pulling apart of the edges of the incision  Increase in overall body temperature (greater than 100.5 degrees)  Your surgeon will instruct you regarding suture or staple removal    V. Medications:   1. Anticoagulants: You will be discharged on an anticoagulant. This is a prophylactic medication that helps prevent blood clots during your post-operative period. The type and length of dosage varies based on your individual needs, procedure performed, and surgeon’s preference.  While taking the anticoagulant, you should avoid taking any additional aspirin, ibuprofen (Advil or Motrin), Aleve (Naprosyn) or other non-steroidal anti-inflammatory medications.   Notify surgeon  immediately if any nisa bleeding is noted in the urine, stool, emesis, or from the nose or the incision. Blood in the stool will often appear as black rather than red. Blood in urine may appear as pink. Blood in emesis may appear as brown/black like coffee grounds.  You will need to apply pressure for longer periods of time to any cuts or abrasions to stop bleeding  Avoid alcohol while taking anticoagulants    2. Stool Softeners: You will be at greater risk of constipation after surgery due to being less mobile and the pain medications.   Take stool softeners as instructed by your surgeon while on pain medications. Over the counter Colace 100 mg 1-2 capsules twice daily.   If stools become too loose or too frequent, please decreases the dosage or stop the stool softener.  If constipation occurs despite use of stool softeners, you are to continue the stool softeners and add a laxative (Milk of Magnesia 1 ounce daily as needed)  Drink plenty of fluids, and eat fruits and vegetables during your recovery time    3. Pain Medications utilized after surgery are narcotics and the law requires that the following information be given to all patients that are prescribed narcotics:  CLASSIFICATION: Pain medications are called Opioids and are narcotics  LEGALITIES: It is illegal to share narcotics with others and to drive within 24 hours of taking narcotics  POTENTIAL SIDE EFFECTS: Potential side effects of opioids include: nausea, vomiting, itching, dizziness, drowsiness, dry mouth, constipation, and difficulty urinating.  POTENTIAL ADVERSE EFFECTS:   Opioid tolerance can develop with use of pain medications and this simply means that it requires more and more of the medication to control pain; however, this is seen more in patients that use opioids for longer periods of time.  Opioid dependence can develop with use of Opioids and this simply means that to stop the medication can cause withdrawal symptoms; however, this is  seen with patients that use Opioids for longer periods of time.  Opioid addiction can develop with use of Opioids and the incidence of this is very unlikely in patients who take the medications as ordered and stop the medications as instructed.  Opioid overdose can be dangerous, but is unlikely when the medication is taken as ordered and stopped when ordered. It is important not to mix opioids with alcohol or with and type of sedative such as Benadryl as this can lead to over sedation and respiratory difficulty.  DOSAGE:   Pain medications will need to be taken consistently for the first week to decrease pain and promote adequate pain relief and participation in physical therapy.  After the initial surgical pain begins to resolve, you may begin to decrease the pain medication. By the end of 6 weeks, you should be off of pain medications.  Refills will not be given by the office during evening hours, on weekends, or after 6 weeks post-op.  To seek refills on pain medications during the initial 6 week post-operative period, you must call the office 48 hours in advance to request the refill. The office will then notify you when to  the prescription. DO NOT wait until you are out of the medication to request a refill.    V. FOLLOW-UP VISITS:  You will need to follow up in the office with your surgeon in 2 weeks. Please call this number [phone] to schedule this appointment.  If you have any concerns or suspected complications prior to your follow up visit, please call your surgeons office. Do not wait until your appointment time if you suspect complications. These will need to be addressed in the office promptly.

## 2024-10-23 NOTE — H&P
[facility]   HISTORY AND PHYSICAL    Patient Name:Trish Daley  : 1965  MRN: [id number]  Primary Care Physician: Niki Collins DO  Date of admission: 10/24/2024    Subjective   Subjective     Chief Complaint: Right knee pain/OA    History of Present Illness   Trish Daley is a 59 y.o. female longstanding history of worsening right knee pain.  She states that she feels the pain predominantly anterior medially and posteriorly in her knee.  She is exhausted conservative treatments and was seen by me in the office on 8/15/2024.  At that point time she was scheduled for right total knee arthroplasty.  She presents to the hospital today for that procedure    Review of Systems   Musculoskeletal:  Positive for arthralgias, gait problem, joint swelling and myalgias.   All other systems reviewed and are negative.        Personal History     Past Medical History:   Diagnosis Date    Allergic     Dust, Mold, Bell Peppers, percocet    Arthritis     knees, hands, shoulder, neck    Arthritis of back     Arthritis of neck     Asthma     Cancer     uterus    Cervical disc disorder     Depression     Headache     weather    Knee swelling     Rotator cuff syndrome     Tear of meniscus of knee     Thoracic disc disorder     Tremor     Hand jerks when trying to hold it still.  Have to brace it.       Past Surgical History:   Procedure Laterality Date    ENDOMETRIAL ABLATION      remove     FRACTURE SURGERY      left wrist    HERNIA REPAIR      HYSTERECTOMY         Family History: Her family history includes Arthritis in her father; Asthma in her paternal aunt; Broken bones in her sister; COPD in her father and mother; Cancer in her father; Dislocations in her sister; Heart disease in her sister; Hypertension in her daughter, father, mother, sister, son, and son; Osteoporosis in her mother; Scoliosis in her sister; Vision loss in her father.     Social History: She  reports that she has never smoked. She  has been exposed to tobacco smoke. She has never used smokeless tobacco. She reports current alcohol use of about 1.0 standard drink of alcohol per week. She reports that she does not use drugs.    Home Medications:  Turmeric, albuterol sulfate HFA, diphenhydrAMINE-acetaminophen, and vitamin D3    Allergies:  She is allergic to cantaloupe (diagnostic), codeine, cucumber extract, oxycodone-acetaminophen, pepper, and watermelon [citrullus vulgaris].    Objective    Objective     Vitals:         Physical Exam   Right Knee Exam      Tenderness   The patient is experiencing tenderness in the medial retinaculum and medial joint line.     Range of Motion   Extension:  0   Flexion:  110      Tests   Marvin:  Medial - positive   Varus: negative Valgus: negative  Lachman:  Anterior - negative    Posterior - negative  Drawer:  Anterior - negative    Posterior - negative     Other   Erythema: absent  Scars: absent  Sensation: normal  Pulse: present  Swelling: mild  Effusion: no effusion present  Result Review    Result Review:  I have personally reviewed the results from the time of this admission to 10/24/2024 06:29 EDT and agree with these findings:  []  Laboratory list / accordion  []  Microbiology  []  Radiology  []  EKG/Telemetry   []  Cardiology/Vascular   []  Pathology  []  Old records  []  Other:  Most notable findings include:   Imagin views of the right knee  Indication: right knee pain  Findings: X-rays demonstrate no acute osseous abnormality.  There is no signs of fracture dislocation or subluxation.  There is joint space narrowing, subchondral sclerosis, cystic changes, and periarticular osteophytes most pronounced in the Medial joint.  Comparative studies: None      Assessment & Plan   Assessment / Plan     Brief Patient Summary:  Trish Daley is a 59 y.o. female with severe right knee osteoarthritis    Active Hospital Problems:  Active Hospital Problems    Diagnosis     **Primary osteoarthritis of right  knee      Plan:   Cc: f/u right knee pain, DJD    Patient presented to clinic today for preoperative history and physical visit in anticipation of upcoming scheduled right total knee arthroplasty.    I reviewed anatomy and a model of a total knee arthroplasty, as well as typical postoperative recovery of 6-12 months before maximal recovery, and possible need for rehabilitation stay after hospitalization. We also discussed risks, benefits, and alternatives of procedure with risks including but not limited to neurovascular damage, bleeding, infection, malalignment, chronic pian, failure of implants, osteolysis, loosening of implants, loss of motion, weakness, stiffness, instability, DVT, pulmonary embolus, death, stroke, complex regional pain syndrome, myocardial infarction, and need for additional procedures. Patient understood all these and had all questions answered before consenting to the procedure. No guarantees were given in regards to results from the surgery.  Patient has been medically optimize by his primary care physician.    Plan for right total knee arthroplasty.    Implants: Mcclain and Nephew cemented Legion TKS with [employer]  Anticoagulation: Asprin  Antibiotics: Cefazolin  Admission Type: Same Day  Post op ABX: No  TXA: Yes    All remaining questions answered today.     VTE Prophylaxis:  Mechanical VTE prophylaxis orders are present.        Garcia Huang MD

## 2024-10-23 NOTE — OP NOTE
OPERATIVE REPORT    Date of Surgery:   10/24/24    Attending Surgeon:   Garcia Huang MD, MSE    ASSISTANTS:    Assistant: (Harshal Siegel CSA) was responsible for performing the following activities: Retraction, Suction, Suturing, Closing, and Placing Dressing and their skilled assistance was necessary for the success of this case.     PREOPERATIVE DIAGNOSIS:   Right knee osteoarthritis    POSTOPERATIVE DIAGNOSIS:   Same as above     PROCEDURE:   1. Right primary total knee arthroplasty, with robotic assistance (CORI)    Surgical Approach:        IMPLANTS:   : [employer]  Brand: Legion TKS  Femoral component size: 5 CR  Tibial component size: 3   Patellar Button: none  Bearing type: CR  Insert Thickness: 10 mm    SURGICAL DETAILS:  Preoperative Passive Range of Motion: -5 to 141 degrees  Postoperative Passive Range of Motion: 0 to 135 degrees  Pre Op 4 degrees Varus,  Post Op 1 degrees Varus   Incision/arthrotomy type: Medial parapatellar  Posterior Cruciate Ligament: Retained  Lateral release: No  Estimated blood loss: 100 cc  Anesthesia type: Spinal and Regional  Tourniquet: Yes: 250 mmHG     INDICATIONS FOR PROCEDURE:  This patient presents today with end stage degenerative joint disease of the knee. The patient has failed non-operative treatment and presents for total knee replacement. Risks, complications, and benefits of the procedure have been discussed and all questions have been answered preoperatively.    PROCEDURE:  The patient was met in the preoperative holding area, evaluated, consent was obtained, and the Right knee was marked. The patient was brought to the operating room and placed on the operating room table in the supine position. After anesthesia was established, the patient was positioned supine. Bony prominences were padded. The patient was given prophylactic antibiotics within one hour of skin incision. The involved lower extremity was prepped and draped in usual  sterile fashion.  Surgical timeout was taken to confirm the operative side and planned procedure.    A straight incision was used medial to the midline carried through the subcutaneous tissue to the underlying extensor mechanism. A medial parapatellar approach was utilized.  A small medial peel was then performed using Bovie electrocautery and the fat pad was sharply excised. The patella was everted, a lateral facetectomy was performed, marginal osteophytes trimmed,  and the synovial margin was cauterized, clearing excess synovium.  The synovium at the superolateral aspect of the junction of the trochlea and the anterior cortex the femur was removed with Bovie electrocautery and the knee was flexed.  The tourniquet was deflated at this point time.  The ACL was then removed and all overhanging tibial and femoral osteophytes were removed with a rongeur.  The tibial and femoral reference arrays were assembled first. 2 drill tip threaded pins were placed medial to the tibial tubercle within the surgical field. The pins were inserted under power using the drill guide included in the CORI set for their relative positions. Similarly, the 2 femoral pins were placed medially in the femur just proxmial to the medial epicondyle. The sensor arrays were assembled to the pins    The hip center was registered. The medial and lateral malleoli were registered. The femur and tibia were registered.    The knee motion and balance was assessed with standard poses. Pre-operative cut planning was adjusted to provide knee balance throughout the range of motion    We then turned out attention to the distal femur and using the CORI bur the distal femur resection was performed.  2 bur holes were then placed and the distal femoral punch was introduced for the  holes for the 4 in 1 femoral cutting block.     We then turned to the tibia and using the probe with a flat disc attached to it the tibial cutting block was floated into place using  robotic navication. It was held in place with 2 a to p pins and one cross pin.  Depth resection, varus/valgus, and tibial slope were again confirmed. We then performed our tibial resection with a Z retractor medially, bent homman laterally, and PCL retractor posteriorly.  The tibia resection was then removed.    Attention was then turned back to the femur and the appropriately sized 4 in 1 femoral cutting block was malleted into place and secured with 2 convergent threaded pins.     A laminar  was placed and the medial and lateral menisci were excised. Posterior femoral osteophytes were removed with an osteotome. The bovie was used to cauterize the posterior knee capsule and meniscal remnants.     The tibial surface was then sized using the trial baseplate and secured with 2 pins.  Careful attention was taken to ensure it was aligned with the medial third of the tibial tubercle. The trial femur was then impacted into place and the appropriate sized trail poly was inserted.  Knee motion and balance were checked manually and with the robotic assistance. The patella was not resurfaced. The patella tracked well. The femoral lug drills were performed. The trial femoral components and poly were removed. The tracker pins and arrays were removed.    Attention was then turned to the tibial prep.  The keel drill and punch introduced.     The trial tibial implant was removed. All bone was then prepared with lavage and drying for preparation prior to final component placement. The final tibial component was cemented into position and excess cement was removed. The final femoral component was impacted.   The trial poly was placed. After cement curing, motion and stability was again tested. The final tibial insert was exchanged to the final tibial insert which was impacted into position.  Surgical site was irrigated with dilute Betadine solution and then normal saline with pulsatile lavage.  The medial and lateral  capsular flaps as well as tibial and femoral periosteum was injected with a mixture consisting of 30 mL of 0.5% ropivacaine, 30 mg of ketorolac, and epinephrine 0.2 mg solution diluted in 30 mL of normal saline.    The arthrotomy was closed with #1 PDS stratafix. The subcutaneous tissue was closed with 2-0 monocryl. The skin was closed with running 3-0 monocryl stratafix and dermabond a sterile SUSANNA dressing was placed.      The patient tolerated the procedure well and was taken to the recovery room in stable condition.  Following completion of the surgery all sponge instrument needle counts were correct x2.    POSTOPERATIVE PLAN:   1. Weightbearing as tolerated on operative extremity  2. DVT prophylaxis    WOUND CLOSURE:  #1 PDS Stratfix  2-0 monocryl subcuticular   3-0 monocryl stratafix skin  dermabond  SUSANNA dressing  Ace wrap    SPONGE/INSTRUMENT/NEEDLE COUNTS:   Correct x 2    CONDITION ON DISCHARGE:   Stable    COMPLICATIONS:   None    Garcia Huang MD, MSE

## 2024-10-24 ENCOUNTER — HOSPITAL ENCOUNTER (OUTPATIENT)
Facility: HOSPITAL | Age: 59
Setting detail: HOSPITAL OUTPATIENT SURGERY
Discharge: HOME | End: 2024-10-24
Attending: INTERNAL MEDICINE | Admitting: INTERNAL MEDICINE
Payer: COMMERCIAL

## 2024-10-24 ENCOUNTER — APPOINTMENT (OUTPATIENT)
Dept: GENERAL RADIOLOGY | Facility: HOSPITAL | Age: 59
End: 2024-10-24
Payer: COMMERCIAL

## 2024-10-24 ENCOUNTER — ANESTHESIA EVENT (OUTPATIENT)
Dept: PERIOP | Facility: HOSPITAL | Age: 59
End: 2024-10-24
Payer: COMMERCIAL

## 2024-10-24 ENCOUNTER — ANESTHESIA (OUTPATIENT)
Dept: PERIOP | Facility: HOSPITAL | Age: 59
End: 2024-10-24
Payer: COMMERCIAL

## 2024-10-24 VITALS
OXYGEN SATURATION: 95 % | SYSTOLIC BLOOD PRESSURE: 112 MMHG | RESPIRATION RATE: 16 BRPM | WEIGHT: 216.6 LBS | HEART RATE: 67 BPM | TEMPERATURE: 97.5 F | HEIGHT: 64 IN | BODY MASS INDEX: 36.98 KG/M2 | DIASTOLIC BLOOD PRESSURE: 72 MMHG

## 2024-10-24 DIAGNOSIS — M17.11 PRIMARY OSTEOARTHRITIS OF RIGHT KNEE: ICD-10-CM

## 2024-10-24 DIAGNOSIS — Z96.651 S/P TKR (TOTAL KNEE REPLACEMENT), RIGHT: Primary | ICD-10-CM

## 2024-10-24 PROCEDURE — 27447 TOTAL KNEE ARTHROPLASTY: CPT | Performed by: INTERNAL MEDICINE

## 2024-10-24 PROCEDURE — 25810000003 LACTATED RINGERS SOLUTION: Performed by: INTERNAL MEDICINE

## 2024-10-24 PROCEDURE — 25010000002 ROPIVACAINE PER 1 MG: Performed by: ANESTHESIOLOGY

## 2024-10-24 PROCEDURE — C1776 JOINT DEVICE (IMPLANTABLE): HCPCS | Performed by: INTERNAL MEDICINE

## 2024-10-24 PROCEDURE — C1713 ANCHOR/SCREW BN/BN,TIS/BN: HCPCS | Performed by: INTERNAL MEDICINE

## 2024-10-24 PROCEDURE — 25010000002 CEFAZOLIN PER 500 MG: Performed by: INTERNAL MEDICINE

## 2024-10-24 PROCEDURE — 97162 PT EVAL MOD COMPLEX 30 MIN: CPT

## 2024-10-24 PROCEDURE — S0260 H&P FOR SURGERY: HCPCS | Performed by: INTERNAL MEDICINE

## 2024-10-24 PROCEDURE — 25010000002 LIDOCAINE (CARDIAC): Performed by: NURSE ANESTHETIST, CERTIFIED REGISTERED

## 2024-10-24 PROCEDURE — 25810000003 LACTATED RINGERS PER 1000 ML: Performed by: INTERNAL MEDICINE

## 2024-10-24 PROCEDURE — 27447 TOTAL KNEE ARTHROPLASTY: CPT

## 2024-10-24 PROCEDURE — 25010000002 EPINEPHRINE 1 MG/ML SOLUTION: Performed by: INTERNAL MEDICINE

## 2024-10-24 PROCEDURE — 25010000002 BUPIVACAINE IN DEXTROSE 0.75-8.25 % SOLUTION: Performed by: ANESTHESIOLOGY

## 2024-10-24 PROCEDURE — 25010000002 MIDAZOLAM PER 1 MG: Performed by: NURSE ANESTHETIST, CERTIFIED REGISTERED

## 2024-10-24 PROCEDURE — 20985 CPTR-ASST DIR MS PX: CPT

## 2024-10-24 PROCEDURE — 25010000002 CLONIDINE PER 1 MG: Performed by: INTERNAL MEDICINE

## 2024-10-24 PROCEDURE — 25010000002 PROPOFOL 1000 MG/100ML EMULSION: Performed by: NURSE ANESTHETIST, CERTIFIED REGISTERED

## 2024-10-24 PROCEDURE — 73560 X-RAY EXAM OF KNEE 1 OR 2: CPT

## 2024-10-24 PROCEDURE — 20985 CPTR-ASST DIR MS PX: CPT | Performed by: INTERNAL MEDICINE

## 2024-10-24 PROCEDURE — 25010000002 KETOROLAC TROMETHAMINE PER 15 MG: Performed by: INTERNAL MEDICINE

## 2024-10-24 PROCEDURE — 25010000002 ROPIVACAINE PER 1 MG: Performed by: INTERNAL MEDICINE

## 2024-10-24 PROCEDURE — 25010000002 PHENYLEPHRINE 10 MG/ML SOLUTION: Performed by: NURSE ANESTHETIST, CERTIFIED REGISTERED

## 2024-10-24 PROCEDURE — 25010000002 MAGNESIUM SULFATE PER 500 MG OF MAGNESIUM: Performed by: NURSE ANESTHETIST, CERTIFIED REGISTERED

## 2024-10-24 PROCEDURE — 25010000002 FENTANYL CITRATE (PF) 250 MCG/5ML SOLUTION: Performed by: ANESTHESIOLOGY

## 2024-10-24 DEVICE — POROUS TIBIA BASEPLATE W/JRNY LOCK                                    SZ 3 RIGHT
Type: IMPLANTABLE DEVICE | Site: KNEE | Status: FUNCTIONAL
Brand: K-15 PORK

## 2024-10-24 DEVICE — DEV CONTRL TISS STRATAFIX SPIRAL MNCRYL UD 3/0 PLS 30CM: Type: IMPLANTABLE DEVICE | Site: KNEE | Status: FUNCTIONAL

## 2024-10-24 DEVICE — IMPLANTABLE DEVICE: Type: IMPLANTABLE DEVICE | Site: KNEE | Status: FUNCTIONAL

## 2024-10-24 DEVICE — DEV WND/CLS CONTRL TISS STRATAFIX SYMM PDS PLS CTX 60CM VIL: Type: IMPLANTABLE DEVICE | Site: KNEE | Status: FUNCTIONAL

## 2024-10-24 DEVICE — LEGION CR HIGH FLEX XLPE INSERT W/                                    JRNY LOCK SZ 3-4 10MM
Type: IMPLANTABLE DEVICE | Site: KNEE | Status: FUNCTIONAL
Brand: LEGION

## 2024-10-24 DEVICE — CMT BONE PALACOS R HI/VISC 1X40: Type: IMPLANTABLE DEVICE | Site: KNEE | Status: FUNCTIONAL

## 2024-10-24 DEVICE — LEGION POROUS CRUCIATE RETAINING                                    FEMORAL SIZE 5 RIGHT
Type: IMPLANTABLE DEVICE | Site: KNEE | Status: FUNCTIONAL
Brand: LEGION

## 2024-10-24 RX ORDER — MAGNESIUM SULFATE HEPTAHYDRATE 500 MG/ML
INJECTION, SOLUTION INTRAMUSCULAR; INTRAVENOUS AS NEEDED
Status: DISCONTINUED | OUTPATIENT
Start: 2024-10-24 | End: 2024-10-24 | Stop reason: SURG

## 2024-10-24 RX ORDER — FLUMAZENIL 0.1 MG/ML
0.2 INJECTION INTRAVENOUS AS NEEDED
Status: DISCONTINUED | OUTPATIENT
Start: 2024-10-24 | End: 2024-10-24 | Stop reason: HOSPADM

## 2024-10-24 RX ORDER — MIDAZOLAM HYDROCHLORIDE 1 MG/ML
1 INJECTION INTRAMUSCULAR; INTRAVENOUS
Status: DISCONTINUED | OUTPATIENT
Start: 2024-10-24 | End: 2024-10-24 | Stop reason: HOSPADM

## 2024-10-24 RX ORDER — DEXMEDETOMIDINE HYDROCHLORIDE 100 UG/ML
INJECTION, SOLUTION INTRAVENOUS
Status: COMPLETED | OUTPATIENT
Start: 2024-10-24 | End: 2024-10-24

## 2024-10-24 RX ORDER — HYDROCODONE BITARTRATE AND ACETAMINOPHEN 5; 325 MG/1; MG/1
1 TABLET ORAL EVERY 4 HOURS PRN
Qty: 30 TABLET | Refills: 0 | Status: SHIPPED | OUTPATIENT
Start: 2024-10-24

## 2024-10-24 RX ORDER — ASPIRIN 81 MG/1
81 TABLET ORAL EVERY 12 HOURS SCHEDULED
Status: DISCONTINUED | OUTPATIENT
Start: 2024-10-25 | End: 2024-10-24 | Stop reason: HOSPADM

## 2024-10-24 RX ORDER — TRANEXAMIC ACID 100 MG/ML
INJECTION, SOLUTION INTRAVENOUS AS NEEDED
Status: DISCONTINUED | OUTPATIENT
Start: 2024-10-24 | End: 2024-10-24 | Stop reason: SURG

## 2024-10-24 RX ORDER — PROPOFOL 10 MG/ML
INJECTION, EMULSION INTRAVENOUS AS NEEDED
Status: DISCONTINUED | OUTPATIENT
Start: 2024-10-24 | End: 2024-10-24 | Stop reason: SURG

## 2024-10-24 RX ORDER — FENTANYL CITRATE 50 UG/ML
INJECTION, SOLUTION INTRAMUSCULAR; INTRAVENOUS AS NEEDED
Status: DISCONTINUED | OUTPATIENT
Start: 2024-10-24 | End: 2024-10-24 | Stop reason: SURG

## 2024-10-24 RX ORDER — SODIUM CHLORIDE, SODIUM LACTATE, POTASSIUM CHLORIDE, CALCIUM CHLORIDE 600; 310; 30; 20 MG/100ML; MG/100ML; MG/100ML; MG/100ML
20 INJECTION, SOLUTION INTRAVENOUS ONCE
Status: COMPLETED | OUTPATIENT
Start: 2024-10-24 | End: 2024-10-24

## 2024-10-24 RX ORDER — NALOXONE HCL 0.4 MG/ML
0.4 VIAL (ML) INJECTION AS NEEDED
Status: DISCONTINUED | OUTPATIENT
Start: 2024-10-24 | End: 2024-10-24 | Stop reason: HOSPADM

## 2024-10-24 RX ORDER — ONDANSETRON 2 MG/ML
4 INJECTION INTRAMUSCULAR; INTRAVENOUS ONCE AS NEEDED
Status: DISCONTINUED | OUTPATIENT
Start: 2024-10-24 | End: 2024-10-24 | Stop reason: HOSPADM

## 2024-10-24 RX ORDER — ONDANSETRON 4 MG/1
4 TABLET, FILM COATED ORAL EVERY 8 HOURS PRN
Qty: 30 TABLET | Refills: 0 | Status: SHIPPED | OUTPATIENT
Start: 2024-10-24

## 2024-10-24 RX ORDER — FENTANYL CITRATE 50 UG/ML
INJECTION, SOLUTION INTRAMUSCULAR; INTRAVENOUS
Status: COMPLETED | OUTPATIENT
Start: 2024-10-24 | End: 2024-10-24

## 2024-10-24 RX ORDER — HYDRALAZINE HYDROCHLORIDE 20 MG/ML
5 INJECTION INTRAMUSCULAR; INTRAVENOUS
Status: DISCONTINUED | OUTPATIENT
Start: 2024-10-24 | End: 2024-10-24 | Stop reason: HOSPADM

## 2024-10-24 RX ORDER — MIDAZOLAM HYDROCHLORIDE 1 MG/ML
INJECTION INTRAMUSCULAR; INTRAVENOUS AS NEEDED
Status: DISCONTINUED | OUTPATIENT
Start: 2024-10-24 | End: 2024-10-24 | Stop reason: SURG

## 2024-10-24 RX ORDER — PHENYLEPHRINE HYDROCHLORIDE 10 MG/ML
INJECTION INTRAVENOUS AS NEEDED
Status: DISCONTINUED | OUTPATIENT
Start: 2024-10-24 | End: 2024-10-24 | Stop reason: SURG

## 2024-10-24 RX ORDER — SODIUM CHLORIDE 0.9 % (FLUSH) 0.9 %
10 SYRINGE (ML) INJECTION AS NEEDED
Status: DISCONTINUED | OUTPATIENT
Start: 2024-10-24 | End: 2024-10-24 | Stop reason: HOSPADM

## 2024-10-24 RX ORDER — IPRATROPIUM BROMIDE AND ALBUTEROL SULFATE 2.5; .5 MG/3ML; MG/3ML
3 SOLUTION RESPIRATORY (INHALATION) ONCE AS NEEDED
Status: DISCONTINUED | OUTPATIENT
Start: 2024-10-24 | End: 2024-10-24 | Stop reason: HOSPADM

## 2024-10-24 RX ORDER — LABETALOL HYDROCHLORIDE 5 MG/ML
5 INJECTION, SOLUTION INTRAVENOUS
Status: DISCONTINUED | OUTPATIENT
Start: 2024-10-24 | End: 2024-10-24 | Stop reason: HOSPADM

## 2024-10-24 RX ORDER — AMOXICILLIN 250 MG
1 CAPSULE ORAL DAILY
Qty: 30 TABLET | Refills: 0 | Status: SHIPPED | OUTPATIENT
Start: 2024-10-24

## 2024-10-24 RX ORDER — BUPIVACAINE HYDROCHLORIDE 7.5 MG/ML
INJECTION, SOLUTION INTRASPINAL
Status: COMPLETED | OUTPATIENT
Start: 2024-10-24 | End: 2024-10-24

## 2024-10-24 RX ORDER — ACETAMINOPHEN 650 MG/1
650 SUPPOSITORY RECTAL EVERY 4 HOURS PRN
Status: DISCONTINUED | OUTPATIENT
Start: 2024-10-24 | End: 2024-10-24 | Stop reason: HOSPADM

## 2024-10-24 RX ORDER — PREGABALIN 75 MG/1
150 CAPSULE ORAL ONCE
Status: COMPLETED | OUTPATIENT
Start: 2024-10-24 | End: 2024-10-24

## 2024-10-24 RX ORDER — MEPERIDINE HYDROCHLORIDE 25 MG/ML
12.5 INJECTION INTRAMUSCULAR; INTRAVENOUS; SUBCUTANEOUS
Status: DISCONTINUED | OUTPATIENT
Start: 2024-10-24 | End: 2024-10-24 | Stop reason: HOSPADM

## 2024-10-24 RX ORDER — TRANEXAMIC ACID 10 MG/ML
1000 INJECTION, SOLUTION INTRAVENOUS ONCE
Status: DISCONTINUED | OUTPATIENT
Start: 2024-10-24 | End: 2024-10-24 | Stop reason: HOSPADM

## 2024-10-24 RX ORDER — HYDROCODONE BITARTRATE AND ACETAMINOPHEN 7.5; 325 MG/1; MG/1
1 TABLET ORAL EVERY 4 HOURS PRN
Status: DISCONTINUED | OUTPATIENT
Start: 2024-10-24 | End: 2024-10-24 | Stop reason: HOSPADM

## 2024-10-24 RX ORDER — ACETAMINOPHEN 325 MG/1
650 TABLET ORAL ONCE AS NEEDED
Status: DISCONTINUED | OUTPATIENT
Start: 2024-10-24 | End: 2024-10-24 | Stop reason: HOSPADM

## 2024-10-24 RX ORDER — ASPIRIN 81 MG/1
81 TABLET ORAL 2 TIMES DAILY
Qty: 60 TABLET | Refills: 0 | Status: SHIPPED | OUTPATIENT
Start: 2024-10-24

## 2024-10-24 RX ORDER — FENTANYL CITRATE 50 UG/ML
50 INJECTION, SOLUTION INTRAMUSCULAR; INTRAVENOUS
Status: DISCONTINUED | OUTPATIENT
Start: 2024-10-24 | End: 2024-10-24 | Stop reason: HOSPADM

## 2024-10-24 RX ORDER — MELOXICAM 15 MG/1
15 TABLET ORAL ONCE
Status: COMPLETED | OUTPATIENT
Start: 2024-10-24 | End: 2024-10-24

## 2024-10-24 RX ORDER — ROPIVACAINE HYDROCHLORIDE 5 MG/ML
INJECTION, SOLUTION EPIDURAL; INFILTRATION; PERINEURAL
Status: COMPLETED | OUTPATIENT
Start: 2024-10-24 | End: 2024-10-24

## 2024-10-24 RX ORDER — DIPHENHYDRAMINE HYDROCHLORIDE 50 MG/ML
12.5 INJECTION INTRAMUSCULAR; INTRAVENOUS
Status: DISCONTINUED | OUTPATIENT
Start: 2024-10-24 | End: 2024-10-24 | Stop reason: HOSPADM

## 2024-10-24 RX ORDER — HYDROCODONE BITARTRATE AND ACETAMINOPHEN 7.5; 325 MG/1; MG/1
1 TABLET ORAL ONCE AS NEEDED
Status: COMPLETED | OUTPATIENT
Start: 2024-10-24 | End: 2024-10-24

## 2024-10-24 RX ORDER — LIDOCAINE HYDROCHLORIDE 10 MG/ML
0.5 INJECTION, SOLUTION EPIDURAL; INFILTRATION; INTRACAUDAL; PERINEURAL ONCE AS NEEDED
Status: DISCONTINUED | OUTPATIENT
Start: 2024-10-24 | End: 2024-10-24 | Stop reason: HOSPADM

## 2024-10-24 RX ORDER — DEXMEDETOMIDINE HYDROCHLORIDE 100 UG/ML
INJECTION, SOLUTION INTRAVENOUS AS NEEDED
Status: DISCONTINUED | OUTPATIENT
Start: 2024-10-24 | End: 2024-10-24 | Stop reason: SURG

## 2024-10-24 RX ORDER — SCOLOPAMINE TRANSDERMAL SYSTEM 1 MG/1
PATCH, EXTENDED RELEASE TRANSDERMAL AS NEEDED
Status: DISCONTINUED | OUTPATIENT
Start: 2024-10-24 | End: 2024-10-24 | Stop reason: SURG

## 2024-10-24 RX ADMIN — PREGABALIN 150 MG: 75 CAPSULE ORAL at 06:39

## 2024-10-24 RX ADMIN — MIDAZOLAM 2 MG: 1 INJECTION INTRAMUSCULAR; INTRAVENOUS at 07:26

## 2024-10-24 RX ADMIN — FENTANYL CITRATE 20 MCG: 50 INJECTION, SOLUTION INTRAMUSCULAR; INTRAVENOUS at 07:09

## 2024-10-24 RX ADMIN — MELOXICAM 15 MG: 15 TABLET ORAL at 06:39

## 2024-10-24 RX ADMIN — MAGNESIUM SULFATE HEPTAHYDRATE 1 G: 500 INJECTION, SOLUTION INTRAMUSCULAR; INTRAVENOUS at 07:55

## 2024-10-24 RX ADMIN — SODIUM CHLORIDE, SODIUM LACTATE, POTASSIUM CHLORIDE, AND CALCIUM CHLORIDE: .6; .31; .03; .02 INJECTION, SOLUTION INTRAVENOUS at 08:53

## 2024-10-24 RX ADMIN — DEXMEDETOMIDINE HYDROCHLORIDE 40 MCG: 100 INJECTION, SOLUTION INTRAVENOUS at 07:15

## 2024-10-24 RX ADMIN — SCOPALAMINE 1 PATCH: 1 PATCH, EXTENDED RELEASE TRANSDERMAL at 08:15

## 2024-10-24 RX ADMIN — PROPOFOL INJECTABLE EMULSION 80 MCG/KG/MIN: 10 INJECTION, EMULSION INTRAVENOUS at 07:34

## 2024-10-24 RX ADMIN — LIDOCAINE HYDROCHLORIDE 60 MG: 20 INJECTION, SOLUTION INTRAVENOUS at 07:31

## 2024-10-24 RX ADMIN — PHENYLEPHRINE HYDROCHLORIDE 100 MCG: 10 INJECTION INTRAVENOUS at 07:41

## 2024-10-24 RX ADMIN — TRANEXAMIC ACID 1000 MG: 100 INJECTION, SOLUTION INTRAVENOUS at 08:55

## 2024-10-24 RX ADMIN — SODIUM CHLORIDE, SODIUM LACTATE, POTASSIUM CHLORIDE, AND CALCIUM CHLORIDE: .6; .31; .03; .02 INJECTION, SOLUTION INTRAVENOUS at 07:26

## 2024-10-24 RX ADMIN — SODIUM CHLORIDE 2 G: 900 INJECTION INTRAVENOUS at 07:22

## 2024-10-24 RX ADMIN — TRANEXAMIC ACID 1000 MG: 100 INJECTION, SOLUTION INTRAVENOUS at 07:31

## 2024-10-24 RX ADMIN — FENTANYL CITRATE 80 MCG: 50 INJECTION, SOLUTION INTRAMUSCULAR; INTRAVENOUS at 07:10

## 2024-10-24 RX ADMIN — DEXMEDETOMIDINE HYDROCHLORIDE 4 MCG: 100 INJECTION, SOLUTION INTRAVENOUS at 08:46

## 2024-10-24 RX ADMIN — PHENYLEPHRINE HYDROCHLORIDE 100 MCG: 10 INJECTION INTRAVENOUS at 08:15

## 2024-10-24 RX ADMIN — PROPOFOL INJECTABLE EMULSION 50 MG: 10 INJECTION, EMULSION INTRAVENOUS at 07:31

## 2024-10-24 RX ADMIN — HYDROCODONE BITARTRATE AND ACETAMINOPHEN 1 TABLET: 7.5; 325 TABLET ORAL at 11:48

## 2024-10-24 RX ADMIN — ROPIVACAINE HYDROCHLORIDE 30 ML: 5 INJECTION EPIDURAL; INFILTRATION; PERINEURAL at 07:15

## 2024-10-24 RX ADMIN — BUPIVACAINE HYDROCHLORIDE IN DEXTROSE 1 ML: 7.5 INJECTION, SOLUTION SUBARACHNOID at 07:12

## 2024-10-24 RX ADMIN — SODIUM CHLORIDE, SODIUM LACTATE, POTASSIUM CHLORIDE, AND CALCIUM CHLORIDE 500 ML: .6; .31; .03; .02 INJECTION, SOLUTION INTRAVENOUS at 06:38

## 2024-10-24 NOTE — ANESTHESIA POSTPROCEDURE EVALUATION
Patient: Trish Daley    Procedure Summary       Date: 10/24/24 Room / Location: [facility] OR 01 / [facility] MAIN OR    Anesthesia Start: 0726 Anesthesia Stop: 0905    Procedure: TOTAL KNEE ARTHROPLASTY WITH CORI ROBOT (Right: Knee) Diagnosis:       Primary osteoarthritis of right knee      (Primary osteoarthritis of right knee [M17.11])    Surgeons: Garcia Huang MD Provider: Chiquita Degroot MD    Anesthesia Type: spinal, regional, MAC ASA Status: 3            Anesthesia Type: spinal, regional, MAC    Vitals  Vitals Value Taken Time   /69 10/24/24 1004   Temp 97.7 °F (36.5 °C) 10/24/24 0954   Pulse 65 10/24/24 1004   Resp 13 10/24/24 0954   SpO2 93 % 10/24/24 1004   Vitals shown include unfiled device data.        Post Anesthesia Care and Evaluation    Patient location during evaluation: PACU  Patient participation: complete - patient participated  Level of consciousness: awake and alert  Pain management: satisfactory to patient    Airway patency: patent  Anesthetic complications: No anesthetic complications  PONV Status: none  Cardiovascular status: acceptable  Respiratory status: acceptable  Hydration status: acceptable

## 2024-10-24 NOTE — ANESTHESIA PROCEDURE NOTES
Spinal Block      Patient reassessed immediately prior to procedure    Patient location during procedure: pre-op  Start Time: 10/24/2024 7:09 AM  Stop Time: 10/24/2024 7:13 AM  Indication:at surgeon's request, post-op pain management and procedure for pain  Performed By  Anesthesiologist: Chiquita Degroot MD  Preanesthetic Checklist  Completed: patient identified, IV checked, site marked, risks and benefits discussed, surgical consent, monitors and equipment checked, pre-op evaluation and timeout performed  Spinal Block Prep:  Patient Position:sitting  Sterile Tech:cap, gloves and sterile barriers  Prep:Chloraprep  Patient Monitoring:EKG, continuous pulse oximetry and blood pressure monitoring    Spinal Block Procedure  Approach:midline  Guidance:landmark technique and palpation technique  Location:L3-L4  Needle Type:Aleks  Needle Gauge:25 G  Placement of Spinal needle event:cerebrospinal fluid aspirated  Paresthesia: no  Fluid Appearance:clear  Medications: bupivacaine in dextrose (MARCAINE SPINAL) 0.75-8.25 % injection - Intrathecal   1 mL - 10/24/2024 7:12:00 AM  fentaNYL Citrate (PF) (SUBLIMAZE) injection - Intrathecal   20 mcg - 10/24/2024 7:09:00 AM   Post Assessment  Patient Tolerance:patient tolerated the procedure well with no apparent complications  Complications no

## 2024-10-24 NOTE — PLAN OF CARE
Goal Outcome Evaluation:  Plan of Care Reviewed With: patient, spouse           Outcome Evaluation: Trish Daley is a 60 yo F who presented to [facility] on 10/24/24 for elective R TKA by Dr. Huang. Pt POD#0 and WBAT on RLE. Pt is indep and lives with her  and other family members in a 2SH with 2 small SUNNY, all needs met on main floor. Pt reports 7/10 pain in R knee at start of eval which reduced to 5/10 by the end. R knee ROM is 0-70 deg. She had 2 instances of nausea during eval, nursing notified and pt was given crackers and sprite to settle stomach. She was able to transfer and ambulate with CGA using RW. She demos good standing balance and proper sequencing with RW for safe ambulation. Pt educated on post surgical precautions, home exercises, stair navigation, and conservative pain management. She has a RW at home and has no AD needs at this time. Pt not candidate for IP rehab, safe to return home today at d/c and follow up with OP PT tomorrow.    Anticipated Discharge Disposition (PT): home with outpatient therapy services

## 2024-10-24 NOTE — ANESTHESIA PROCEDURE NOTES
Peripheral Block      Patient reassessed immediately prior to procedure    Patient location during procedure: pre-op  Start time: 10/24/2024 7:13 AM  Stop time: 10/24/2024 7:15 AM  Reason for block: at surgeon's request and post-op pain management  Performed by  Anesthesiologist: Chiquita Degroot MD  Preanesthetic Checklist  Completed: patient identified, IV checked, site marked, risks and benefits discussed, surgical consent, monitors and equipment checked, pre-op evaluation and timeout performed  Prep:  Pt Position: supine  Sterile barriers:cap, gloves and sterile barriers  Prep: ChloraPrep  Patient monitoring: blood pressure monitoring, continuous pulse oximetry and EKG  Procedure    Sedation: yes  Performed under: local infiltration  Guidance:ultrasound guided    ULTRASOUND INTERPRETATION.  Using ultrasound guidance a 20 G gauge needle was placed in close proximity to the nerve, at which point, under ultrasound guidance anesthetic was injected in the area of the nerve and spread of the anesthesia was seen on ultrasound in close proximity thereto.  There were no abnormalities seen on ultrasound; a digital image was taken; and the patient tolerated the procedure with no complications. Images:still images obtained, printed/placed on chart    Laterality:right  Block Type:adductor canal block  Injection Technique:single-shot  Needle Type:echogenic  Needle Gauge:20 G  Resistance on Injection: none    Medications Used: dexmedetomidine HCl (PRECEDEX) injection - Perineural   40 mcg - 10/24/2024 7:15:00 AM  ropivacaine (NAROPIN) 0.5 % injection - Perineural   30 mL - 10/24/2024 7:15:00 AM      Medications  Comment:40 mcg precedex in solution    Post Assessment  Injection Assessment: negative aspiration for heme, no paresthesia on injection and incremental injection  Patient Tolerance:comfortable throughout block  Complications:no  Additional Notes  Skin anesthetized with 1% Lidocaine.  Using 20 G, 4 inch stimuplex  echogenic needle,  Local was injected with serial aspirations under continuous ultrasound guidance into adductor canal.  No heme aspirated.  Needle tip visualized throughout.  Good spread noted.  No complications.  No bleeding noted.  Performed by: Chiquita Degroot MD

## 2024-10-24 NOTE — THERAPY EVALUATION
Patient Name: Trish Daley  : 1965    MRN: [id number]                              Today's Date: 10/24/2024       Admit Date: 10/24/2024    Visit Dx:     ICD-10-CM ICD-9-CM   1. S/P TKR (total knee replacement), right  Z96.651 V43.65   2. Primary osteoarthritis of right knee  M17.11 715.16     Patient Active Problem List   Diagnosis    Class 2 obesity due to excess calories without serious comorbidity with body mass index (BMI) of 37.0 to 37.9 in adult    Mild intermittent asthma without complication    Primary osteoarthritis of right knee     Past Medical History:   Diagnosis Date    Allergic     Dust, Mold, Bell Peppers, percocet    Arthritis     knees, hands, shoulder, neck    Arthritis of back     Arthritis of neck     Asthma     Cancer     uterus    Cervical disc disorder     Depression     Headache     weather    Knee swelling     Rotator cuff syndrome     Tear of meniscus of knee     Thoracic disc disorder     Tremor     Hand jerks when trying to hold it still.  Have to brace it.     Past Surgical History:   Procedure Laterality Date    ENDOMETRIAL ABLATION      remove     FRACTURE SURGERY      left wrist    HERNIA REPAIR      HYSTERECTOMY        General Information       Row Name 10/24/24 1525          Physical Therapy Time and Intention    Document Type evaluation  -     Mode of Treatment physical therapy  -       Row Name 10/24/24 1525          General Information    Patient Profile Reviewed yes  -     Prior Level of Function independent:;gait;ADL's;driving  -     Existing Precautions/Restrictions weight bearing  RLE WBAT  -     Barriers to Rehab none identified  -       Row Name 10/24/24 1525          Living Environment    People in Home spouse;parent(s);child(lester), adult  -       Row Name 10/24/24 1525          Home Main Entrance    Number of Stairs, Main Entrance two  -     Stair Railings, Main Entrance none  -       Row Name 10/24/24 1525          Stairs Within  Home, Primary    Number of Stairs, Within Home, Primary none  -       Row Name 10/24/24 1525          Cognition    Orientation Status (Cognition) oriented x 4  -[facility] Name 10/24/24 1525          Safety Issues/Impairments Affecting Functional Mobility    Impairments Affecting Function (Mobility) pain;range of motion (ROM);strength;endurance/activity tolerance  -               User Key  (r) = Recorded By, (t) = Taken By, (c) = Cosigned By      Initials Name Provider Type    Poonam Ponce, ALLIE Physical Therapist                   Mobility       Row Name 10/24/24 1526          Bed Mobility    Bed Mobility bed mobility (all) activities  -     All Activities, [place] (Bed Mobility) contact guard  -     Assistive Device (Bed Mobility) head of bed elevated  -[facility] Name 10/24/24 1526          Sit-Stand Transfer    Sit-Stand [place] (Transfers) contact guard  -     Assistive Device (Sit-Stand Transfers) walker, front-wheeled  -[facility] Name 10/24/24 1526          Gait/Stairs (Locomotion)    [place] Level (Gait) contact guard  -     Assistive Device (Gait) walker, front-wheeled  -     Patient was able to Ambulate yes  -     Distance in Feet (Gait) 100  -               User Key  (r) = Recorded By, (t) = Taken By, (c) = Cosigned By      Initials Name Provider Type    Poonam Ponce, PT Physical Therapist                   Obj/Interventions       Row Name 10/24/24 1527          Range of Motion Comprehensive    General Range of Motion lower extremity range of motion deficits identified  -     Comment, General Range of Motion RLE knee 0-70 deg  -[facility] Name 10/24/24 1527          Strength Comprehensive (MMT)    General Manual Muscle Testing (MMT) Assessment no strength deficits identified  -     Comment, General Manual Muscle Testing (MMT) Assessment RLE knee WFL for ADLs  -       Row Name 10/24/24 1527          Balance    Balance Assessment  sitting static balance;sitting dynamic balance;standing static balance;standing dynamic balance  -     Static Sitting Balance independent  -     Dynamic Sitting Balance standby assist  -     Position, Sitting Balance unsupported;sitting edge of bed  -     Static Standing Balance contact guard  -     Dynamic Standing Balance contact guard  -     Position/Device Used, Standing Balance walker, front-wheeled  -       Row Name 10/24/24 1527          Sensory Assessment (Somatosensory)    Sensory Assessment (Somatosensory) sensation intact  -               User Key  (r) = Recorded By, (t) = Taken By, (c) = Cosigned By      Initials Name Provider Type     Poonam Johnson, PT Physical Therapist                   Goals/Plan    No documentation.                  Clinical Impression       Row Name 10/24/24 1528          Pain    Pretreatment Pain Rating 7/10  -     Posttreatment Pain Rating 5/10  -     Pain Location knee  -     Pain Side/Orientation right  -     Pain Management Interventions cold applied  -       Row Name 10/24/24 1528          Plan of Care Review    Plan of Care Reviewed With patient;spouse  -     Outcome Evaluation Trish Daley is a 58 yo F who presented to [facility] on 10/24/24 for elective R TKA by Dr. Huang. Pt POD#0 and WBAT on RLE. Pt is indep and lives with her  and other family members in a 2SH with 2 small SUNNY, all needs met on main floor. Pt reports 7/10 pain in R knee at start of eval which reduced to 5/10 by the end. R knee ROM is 0-70 deg. She had 2 instances of nausea during eval, nursing notified and pt was given crackers and sprite to settle stomach. She was able to transfer and ambulate with CGA using RW. She demos good standing balance and proper sequencing with RW for safe ambulation. Pt educated on post surgical precautions, home exercises, stair navigation, and conservative pain management. She has a RW at home and has no AD needs at this time. Pt  not candidate for IP rehab, safe to return home today at d/c and follow up with OP PT tomorrow.  -       Row Name 10/24/24 1528          Therapy Assessment/Plan (PT)    Criteria for Skilled Interventions Met (PT) --  -     Therapy Frequency (PT) evaluation only  -       Row Name 10/24/24 1528          Positioning and Restraints    Pre-Treatment Position in bed  -     Post Treatment Position bed  -     In Bed notified nsg;fowlers;call light within reach;with family/caregiver;RLE elevated  -               User Key  (r) = Recorded By, (t) = Taken By, (c) = Cosigned By      Initials Name Provider Type     Poonam Johnson, PT Physical Therapist                   Outcome Measures    No documentation.                                Physical Therapy Education       Title: PT OT SLP Therapies (Done)       Topic: Physical Therapy (Done)       Point: Mobility training (Done)       Learning Progress Summary            Patient Acceptance, E, VU by  at 10/24/2024 1547                      Point: Home exercise program (Done)       Learning Progress Summary            Patient Acceptance, E, VU by  at 10/24/2024 1547                      Point: Body mechanics (Done)       Learning Progress Summary            Patient Acceptance, E, VU by  at 10/24/2024 1547                      Point: Precautions (Done)       Learning Progress Summary            Patient Acceptance, E, VU by  at 10/24/2024 1547                                      User Key       Initials Effective Dates Name Provider Type [facility] 08/12/24 -  Poonam Johnson, ALLIE Physical Therapist PT                  PT Recommendation and Plan     Outcome Evaluation: Trish Daley is a 60 yo F who presented to [facility] on 10/24/24 for elective R TKA by Dr. Huang. Pt POD#0 and WBAT on RLE. Pt is indep and lives with her  and other family members in a 2SH with 2 small SUNNY, all needs met on main floor. Pt reports 7/10 pain in R knee at  start of eval which reduced to 5/10 by the end. R knee ROM is 0-70 deg. She had 2 instances of nausea during eval, nursing notified and pt was given crackers and sprite to settle stomach. She was able to transfer and ambulate with CGA using RW. She demos good standing balance and proper sequencing with RW for safe ambulation. Pt educated on post surgical precautions, home exercises, stair navigation, and conservative pain management. She has a RW at home and has no AD needs at this time. Pt not candidate for IP rehab, safe to return home today at d/c and follow up with OP PT tomorrow.     Time Calculation:         PT Charges       Row Name 10/24/24 1549             Time Calculation    Start Time 1320  -      Stop Time 1408  -      Time Calculation (min) 48 min  -      PT Received On 10/24/24  -                User Key  (r) = Recorded By, (t) = Taken By, (c) = Cosigned By      Initials Name Provider Type     Poonam Johnson, PT Physical Therapist                  Therapy Charges for Today       Code Description Service Date Service Provider Modifiers Qty    [id number]  PT EVAL MOD COMPLEXITY 4 10/24/2024 Poonam Johnson, PT GP 1               PT Discharge Summary  Anticipated Discharge Disposition (PT): home with outpatient therapy services    Poonam Johnson, ALLIE  10/24/2024

## 2024-10-24 NOTE — DISCHARGE INSTR - APPOINTMENTS
[facility]  [address] [place] [id number]  [phone]    Friday October 24 at 9am  Please arrive 15-30 minutes early for paper work

## 2024-10-24 NOTE — ANESTHESIA PREPROCEDURE EVALUATION
Anesthesia Evaluation     Patient summary reviewed and Nursing notes reviewed   no history of anesthetic complications:   NPO Solid Status: > 8 hours  NPO Liquid Status: > 8 hours           Airway   Mallampati: II  TM distance: >3 FB  Neck ROM: full  Dental - normal exam     Pulmonary - normal exam   (+) asthma,  (-) not a smoker  Cardiovascular - normal exam    ECG reviewed        Neuro/Psych  (+) headaches  GI/Hepatic/Renal/Endo    (+) obesity, morbid obesity    Musculoskeletal     Abdominal    Substance History      OB/GYN          Other   arthritis,                   Anesthesia Plan    ASA 3     spinal, regional and MAC   total IV anesthesia  intravenous induction     Anesthetic plan, risks, benefits, and alternatives have been provided, discussed and informed consent has been obtained with: patient.    Use of blood products discussed with patient .    Plan discussed with CRNA.    CODE STATUS:

## 2024-10-28 ENCOUNTER — TELEPHONE (OUTPATIENT)
Dept: ORTHOPEDIC SURGERY | Facility: CLINIC | Age: 59
End: 2024-10-28
Payer: COMMERCIAL

## 2024-10-28 DIAGNOSIS — M17.11 PRIMARY OSTEOARTHRITIS OF RIGHT KNEE: Primary | ICD-10-CM

## 2024-10-28 RX ORDER — TRAMADOL HYDROCHLORIDE 50 MG/1
50 TABLET ORAL EVERY 6 HOURS PRN
Qty: 30 TABLET | Refills: 0 | Status: SHIPPED | OUTPATIENT
Start: 2024-10-28

## 2024-10-28 NOTE — TELEPHONE ENCOUNTER
I received a message from Delicia MUSE asking me to call patient and make her an appointment with her on 10/29 to address some post op concerns.  I called and LM for patient letting her that I got her scheduled for an appointment on 10/29 at 2 pm.  I told her to call the office if that time tomorrow does not work.

## 2024-10-28 NOTE — TELEPHONE ENCOUNTER
Patient called in to see if she could get something else called in for pain. She said every time she takes the hydrocodone she throws up. She has started to ween herself off of them so that she can keep some food down. She also stated that she is allergic to percocet.

## 2024-10-29 ENCOUNTER — OFFICE VISIT (OUTPATIENT)
Dept: ORTHOPEDIC SURGERY | Facility: CLINIC | Age: 59
End: 2024-10-29
Payer: COMMERCIAL

## 2024-10-29 VITALS — WEIGHT: 216 LBS | RESPIRATION RATE: 20 BRPM | OXYGEN SATURATION: 98 % | BODY MASS INDEX: 38.27 KG/M2 | HEIGHT: 63 IN

## 2024-10-29 DIAGNOSIS — Z47.89 ORTHOPEDIC AFTERCARE: Primary | ICD-10-CM

## 2024-10-29 PROCEDURE — 99024 POSTOP FOLLOW-UP VISIT: CPT | Performed by: NURSE PRACTITIONER

## 2024-10-29 NOTE — PROGRESS NOTES
[facility]  Post Operative Visit      Patient Name: Trish Daley  : 1965  Primary Care Physician: Niki Collins DO        Chief Complaint:  S/P right total knee arthroplasty with Dr. Huang on 10/24/24.    HPI:   Trish Daley is a 59 y.o. who presents today for postoperative evaluation. She presents for evaluation of RLE swelling.     She has post op knee related pain. She is experiencing N/V with [place] and was transitioned to Tramadol. Unfortunately, she has not been able to pick that up from the pharmacy until today, so pain has been overall poorly controlled. She endorses some increased anxiety since surgery as well. She has good support at home.     She denies any fevers, surgical site drainage. SUSANNA remains in place and is CDI. She denies any pain in the right calf. She does have some swelling in the RLE. Denies numbness/tingling.     She is working with PT.       Past Medical/Surgical, Social and Family History:  I have reviewed and/or updated pertinent history as noted in the medical record including:  Past Medical History:   Diagnosis Date    Allergic     Dust, Mold, Bell Peppers, percocet    Ankle sprain     Arthritis     knees, hands, shoulder, neck    Arthritis of back     Arthritis of neck     Asthma     Cancer     uterus    Cervical disc disorder     Depression     Fracture of wrist     Fracture, finger     Fracture, foot     Headache     weather    Knee sprain     Knee swelling     Periarthritis of shoulder     Rotator cuff syndrome     Tear of meniscus of knee     Thoracic disc disorder     Tremor     Hand jerks when trying to hold it still.  Have to brace it.    Wrist sprain      Past Surgical History:   Procedure Laterality Date    ENDOMETRIAL ABLATION      remove 2001    FRACTURE SURGERY  2004    left wrist    HERNIA REPAIR      HYSTERECTOMY  2001    JOINT REPLACEMENT  10/24/24    TOTAL KNEE ARTHROPLASTY Right 10/24/2024    Procedure: TOTAL KNEE ARTHROPLASTY WITH CORI ROBOT;   Surgeon: Garcia Huang MD;  Location: [facility] MAIN OR;  Service: Robotics - Ortho;  Laterality: Right;    WRIST SURGERY       Social History     Occupational History    Not on file   Tobacco Use    Smoking status: Never     Passive exposure: Past (father smoked as pt was growing up)    Smokeless tobacco: Never   Vaping Use    Vaping status: Never Used   Substance and Sexual Activity    Alcohol use: Yes     Alcohol/week: 1.0 standard drink of alcohol     Types: 1 Drinks containing 0.5 oz of alcohol per week     Comment: 1 glass of wine weekly    Drug use: Never    Sexual activity: Not Currently     Partners: Male     Birth control/protection: None, Hysterectomy     Comment: monogamous with       Social History     Social History Narrative    Not on file     Family History   Problem Relation Age of Onset    Hypertension Mother     COPD Mother     Osteoporosis Mother     Cancer Father     Hypertension Father     COPD Father     Arthritis Father     Vision loss Father     Hypertension Sister     Heart disease Sister     Scoliosis Sister     Hypertension Daughter     Hypertension Son     Asthma Paternal Aunt     Hypertension Son     Broken bones Sister     Dislocations Sister        Allergies:   Allergies   Allergen Reactions    Cantaloupe (Diagnostic) Other (See Comments)     Ear inflamation    Codeine Arrhythmia    Cucumber Extract Other (See Comments)     Cucumbers earache    Oxycodone-Acetaminophen Arrhythmia    Pepper Other (See Comments)     Bell peppers/inflamation of ears and bowel system    Watermelon [Citrullus Vulgaris] Other (See Comments)     earache    Hydrocodone-Acetaminophen Nausea And Vomiting       Medications:   Home Medications:  Current Outpatient Medications on File Prior to Visit   Medication Sig    albuterol sulfate  (90 Base) MCG/ACT inhaler Inhale 2 puffs Every 6 (Six) Hours As Needed for Wheezing.    aspirin 81 MG EC tablet Take 1 tablet by mouth 2 (Two) Times a Day.     diphenhydrAMINE-acetaminophen (TYLENOL PM)  MG tablet per tablet Take 1 tablet by mouth At Night As Needed for Sleep.    ondansetron (Zofran) 4 MG tablet Take 1 tablet by mouth Every 8 (Eight) Hours As Needed for Nausea or Vomiting.    sennosides-docusate (senna-docusate sodium) 8.6-50 MG per tablet Take 1 tablet by mouth Daily.    traMADol (ULTRAM) 50 MG tablet Take 1 tablet by mouth Every 6 (Six) Hours As Needed for Moderate Pain or Severe Pain.    Turmeric (QC TUMERIC COMPLEX PO) Take  by mouth.    vitamin D3 125 MCG (5000 UT) capsule capsule     [DISCONTINUED] HYDROcodone-acetaminophen (NORCO) 5-325 MG per tablet Take 1 tablet by mouth Every 4 (Four) Hours As Needed for Severe Pain.     No current facility-administered medications on file prior to visit.         ROS:  14 point review of systems was negative except as listed in the HPI     Physical Exam:   59 y.o. female  Body mass index is 38.26 kg/m²., 98 kg (216 lb)  Vitals:    10/29/24 1356   Resp: 20   SpO2: 98%         General: Alert, cooperative, appears well and in no observable distress.   HEENT: Normocephalic, atraumatic on external visual inspection. No icterus.   CV: No significant peripheral edema.   Respiratory: Normal respiratory effort.   Skin: Warm & well perfused; appropriate skin turgor.  Psych: Appropriate mood & affect.  Neuro: Gross sensation and motor intact in affected extremity/extremities.  Vascular: Peripheral pulses palpable in affected extremity/extremities. Calves/compartments soft and nontender, no evidence of DVT or compartment syndrome.    Ortho Exam      RLE  SUSANNA intact over anterior surgical incision  No bleeding or drainage noted  No erythema, warmth noted the knee, calf, ankle or foot  Negative estrellita  No calf tenderness with compression  Strong pedal pulses with normal cap refill   Right calf circumference 16 cm vs Left calf circumference 14 cm       Investigations:  No new x-rays were needed today.               Assessment:  Diagnoses and all orders for this visit:    1. Orthopedic aftercare (Primary)       Body mass index is 38.26 kg/m².  BMI consistent with Obese Class II: 35-39.9kg/m2           Plan:  Reviewed the above exam with with the patient  No strong suspicion for injection or DVT  Swelling most likely r/t post op changes and decreased mobility  Encouraged her to call and report any worsening symptoms of pain, swelling, changes in skin, cough, chest pain or SOA  Begin Tramadol today  Ok to continue PT   Follow up as previously scheduled with Dr. Reina Randall, APRN

## 2024-11-04 DIAGNOSIS — M17.11 PRIMARY OSTEOARTHRITIS OF RIGHT KNEE: ICD-10-CM

## 2024-11-04 NOTE — TELEPHONE ENCOUNTER
PATIENT CALLED IN ASKING FOR A REFILL ON TRAMADOL. SHE HAS 3 PILLS LEFT. PATIENT REQUEST TO SEND TO [facility] IN [place]. PLEASE CALL PATIENT BACK [phone]

## 2024-11-05 RX ORDER — TRAMADOL HYDROCHLORIDE 50 MG/1
50 TABLET ORAL EVERY 6 HOURS PRN
Qty: 30 TABLET | Refills: 0 | Status: SHIPPED | OUTPATIENT
Start: 2024-11-05

## 2024-11-07 ENCOUNTER — OFFICE VISIT (OUTPATIENT)
Dept: ORTHOPEDIC SURGERY | Facility: CLINIC | Age: 59
End: 2024-11-07
Payer: COMMERCIAL

## 2024-11-07 VITALS — HEART RATE: 94 BPM | WEIGHT: 216 LBS | HEIGHT: 63 IN | BODY MASS INDEX: 38.27 KG/M2 | OXYGEN SATURATION: 96 %

## 2024-11-07 DIAGNOSIS — Z96.651 S/P TKR (TOTAL KNEE REPLACEMENT), RIGHT: Primary | ICD-10-CM

## 2024-11-07 PROCEDURE — 99024 POSTOP FOLLOW-UP VISIT: CPT | Performed by: INTERNAL MEDICINE

## 2024-11-07 NOTE — PROGRESS NOTES
Subjective:     Patient ID: Trish Daley is a 59 y.o. female.    Chief Complaint:    History of Present Illness  Trish Daley returns to clinic today for evaluation of right knee status post total knee arthroplasty 2 weeks ago.  The patient is doing quite well.  She denies any fever chills or drainage from surgical incision.  She has been doing physical therapy.  She states the pain is improving.  She states she had to discontinue the narcotic pain medication because it was causing eye dilation.     Social History     Occupational History    Not on file   Tobacco Use    Smoking status: Never     Passive exposure: Past (father smoked as pt was growing up)    Smokeless tobacco: Never   Vaping Use    Vaping status: Never Used   Substance and Sexual Activity    Alcohol use: Yes     Alcohol/week: 1.0 standard drink of alcohol     Types: 1 Drinks containing 0.5 oz of alcohol per week     Comment: 1 glass of wine weekly    Drug use: Never    Sexual activity: Not Currently     Partners: Male     Birth control/protection: None, Hysterectomy     Comment: monogamous with       Past Medical History:   Diagnosis Date    Allergic     Dust, Mold, Bell Peppers, percocet    Ankle sprain     Arthritis     knees, hands, shoulder, neck    Arthritis of back     Arthritis of neck     Asthma     Bursitis of hip     Cancer     uterus    Cervical disc disorder     Depression     Fracture of wrist     Fracture, finger     Fracture, foot     Headache     weather    Knee sprain     Knee swelling     Periarthritis of shoulder     Rotator cuff syndrome     Tear of meniscus of knee     Thoracic disc disorder     Tremor     Hand jerks when trying to hold it still.  Have to brace it.    Wrist sprain      Past Surgical History:   Procedure Laterality Date    ENDOMETRIAL ABLATION      remove 2001    FRACTURE SURGERY  2004    left wrist    HERNIA REPAIR      HYSTERECTOMY  2001    JOINT REPLACEMENT  10/24/24    TOTAL KNEE ARTHROPLASTY Right  "10/24/2024    Procedure: TOTAL KNEE ARTHROPLASTY WITH CORI ROBOT;  Surgeon: Garcia Huang MD;  Location: [facility] MAIN OR;  Service: Robotics - Ortho;  Laterality: Right;    WRIST SURGERY         Family History   Problem Relation Age of Onset    Hypertension Mother     COPD Mother     Osteoporosis Mother     Cancer Father     Hypertension Father     COPD Father     Arthritis Father     Vision loss Father     Hypertension Sister     Heart disease Sister     Scoliosis Sister     Hypertension Daughter     Hypertension Son     Asthma Paternal Aunt     Hypertension Son     Broken bones Sister     Dislocations Sister                  Objective:  Vitals:    24 0856   Weight: 98 kg (216 lb)   Height: 160 cm (63\")         24  0856   Weight: 98 kg (216 lb)     Body mass index is 38.26 kg/m².           Right Knee Exam     Tenderness   Right knee tenderness location: global mild.    Range of Motion   Extension:  0   Flexion:  80     Tests   Varus: negative Valgus: negative  Lachman:  Anterior - negative    Posterior - negative  Drawer:  Anterior - negative    Posterior - negative    Other   Erythema: absent  Scars: present  Sensation: normal  Pulse: present  Swelling: mild  Effusion: no effusion present             Imagin views of the right knee were ordered and reviewed by myself in the office today  Indication: right knee replacement  Findings: X-rays demonstrate a  hybrid  right total knee arthroplasty with an unresurfaced patella with implants in expected position no signs of loosening fracture, dislocation, subluxation, wear or subsidence.  No new acute osseous abnormality.  Comparative studies: Immediate postop radiographs      Assessment:        1. S/P TKR (total knee replacement), right           Plan:          Discussed treatment options at length with patient at today's visit. I discussed with the patient that they are doing well from my standpoint.  The patient has achieved 0  to 80 degrees of " flexion.  I discussed with them that in the coming weeks the most important thing is physical therapy particularly strengthening and range of motion exercises.  I would like the patient to achieve 0 to 120 degrees of flexion by the time they follow-up in 4 weeks for the 6-week follow-up visit.  I discussed with them that it is normal to have stiffness achiness and pain particularly at night and in the morning.  This will continue to improve as time goes on and may continue to improve for 6 to 12 months postoperatively. I offered the patient a narcotic refill.  The patient's surgical incision is healing without signs of infection and there are no signs of ligamentous instability.  I would like the patient to avoid soaking or submersion of the surgical incision in water until all the scabbing has come off or for at least another 2 weeks.  They should clean the incision daily with soapy water in the shower.  I would like the patient to notify our office immediately if they note any increasing redness, pain, fevers, chills, or drainage of any kind from their surgical incision as this would be abnormal at this point in time.  I offered the patient a narcotic refill.  They should continue to take the anticoagulant for a total of 4 weeks.  I would like to see the patient back in 4 weeks for 6-week follow-up appointment.  The patient voiced understanding and agreement with the plan.   Follow up: 4 weeks without x-rays      Trish M Edi was in agreement with plan and had all questions answered.     Medications:  No orders of the defined types were placed in this encounter.      Followup:  No follow-ups on file.    Diagnoses and all orders for this visit:    1. S/P TKR (total knee replacement), right (Primary)  -     XR Knee 1 or 2 View Right          Dictated utilizing Dragon dictation

## 2024-12-02 ENCOUNTER — TELEPHONE (OUTPATIENT)
Dept: ORTHOPEDIC SURGERY | Facility: CLINIC | Age: 59
End: 2024-12-02
Payer: COMMERCIAL

## 2024-12-02 NOTE — TELEPHONE ENCOUNTER
Patient fell on 11/23/24.  He stated that the ability to get up and down is much harder now after the fall.  No increased pain while sitting, standing or walking just getting up and down.  No open areas and PT did check her out and he said that he didn't feel anything abnormal.

## 2024-12-02 NOTE — TELEPHONE ENCOUNTER
Garcia Huang MD  You1 hour ago (11:47 AM)       Thanks for update.  Ill see her Thursday.    Garcia

## 2024-12-02 NOTE — TELEPHONE ENCOUNTER
I called and spoke with patient letting her know that Dr. Huang did not have any concern at this time and would see her at her appointment on Thursday.  I told patient to RICE.  Patient understood.

## 2024-12-02 NOTE — TELEPHONE ENCOUNTER
Caller: Trish Daley    Relationship to patient: Self    Best call back number: [phone]    Patient is needing: PT IS CALLING TO INFORM DR MORRISON THAT SHE HAD A FALL THE OTHER DAY ON HER RIGHT KNEE.. PT HAS AN APPOINTMENT ON 12/05/24.. SHE STATED THAT IF THERE ARE ANY OPENINGS TOMORROW, SHE CAN COME IN.. PLEASE ADVISE..

## 2024-12-05 ENCOUNTER — OFFICE VISIT (OUTPATIENT)
Dept: ORTHOPEDIC SURGERY | Facility: CLINIC | Age: 59
End: 2024-12-05
Payer: COMMERCIAL

## 2024-12-05 VITALS — WEIGHT: 216 LBS | HEIGHT: 63 IN | OXYGEN SATURATION: 100 % | HEART RATE: 96 BPM | BODY MASS INDEX: 38.27 KG/M2

## 2024-12-05 DIAGNOSIS — Z96.651 S/P TKR (TOTAL KNEE REPLACEMENT), RIGHT: Primary | ICD-10-CM

## 2024-12-05 PROCEDURE — 99024 POSTOP FOLLOW-UP VISIT: CPT | Performed by: INTERNAL MEDICINE

## 2024-12-05 NOTE — PROGRESS NOTES
Subjective:     Patient ID: Trish Daley is a 59 y.o. female.    Chief Complaint:    History of Present Illness  Trish Daley returns to clinic today for evaluation of knee status post total knee arthroplasty 6 weeks ago.  The patient is quite tearful in the office today.  She states that she sustained a fall when she was trying to get her dog back under control after they were chasing a stray animal.  The patient states that she has had quite a difficult time with this operation and has had difficulty taking pain medication with pain control and with physical therapy.  She states she achieved about 100 degrees of flexion in therapy the other day but is quite stiff today.  She denies any fevers chills or drainage from her surgical incision.     Social History     Occupational History    Not on file   Tobacco Use    Smoking status: Never     Passive exposure: Past (father smoked as pt was growing up)    Smokeless tobacco: Never   Vaping Use    Vaping status: Never Used   Substance and Sexual Activity    Alcohol use: Yes     Alcohol/week: 1.0 standard drink of alcohol     Types: 1 Drinks containing 0.5 oz of alcohol per week     Comment: 1 glass of wine weekly    Drug use: Never    Sexual activity: Not Currently     Partners: Male     Birth control/protection: None, Hysterectomy     Comment: monogamous with       Past Medical History:   Diagnosis Date    Allergic     Dust, Mold, Bell Peppers, percocet    Ankle sprain     Arthritis     knees, hands, shoulder, neck    Arthritis of back     Arthritis of neck     Asthma     Bursitis of hip     Cancer     uterus    Cervical disc disorder     Depression     Fracture of wrist     Fracture, finger     Fracture, foot     Headache     weather    Knee sprain     Knee swelling     Periarthritis of shoulder     Rotator cuff syndrome     Tear of meniscus of knee     Thoracic disc disorder     Tremor     Hand jerks when trying to hold it still.  Have to brace it.    Wrist  "sprain      Past Surgical History:   Procedure Laterality Date    ENDOMETRIAL ABLATION      remove 2001    FRACTURE SURGERY  2004    left wrist    HERNIA REPAIR      HYSTERECTOMY  2001    JOINT REPLACEMENT  10/24/24    TOTAL KNEE ARTHROPLASTY Right 10/24/2024    Procedure: TOTAL KNEE ARTHROPLASTY WITH CORI ROBOT;  Surgeon: Garcia Huang MD;  Location: [facility] MAIN OR;  Service: Robotics - Ortho;  Laterality: Right;    WRIST SURGERY         Family History   Problem Relation Age of Onset    Hypertension Mother     COPD Mother     Osteoporosis Mother     Cancer Father     Hypertension Father     COPD Father     Arthritis Father     Vision loss Father     Hypertension Sister     Heart disease Sister     Scoliosis Sister     Hypertension Daughter     Hypertension Son     Asthma Paternal Aunt     Hypertension Son     Broken bones Sister     Dislocations Sister                  Objective:  Vitals:    24   Pulse: 96   SpO2: 100%   Weight: 98 kg (216 lb)   Height: 160 cm (63\")         24  09   Weight: 98 kg (216 lb)     Body mass index is 38.26 kg/m².           Right Knee Exam     Tenderness   Right knee tenderness location: global.    Range of Motion   Extension:  5   Flexion:  90     Tests   Varus: negative Valgus: negative  Lachman:  Anterior - negative    Posterior - negative  Drawer:  Anterior - negative    Posterior - negative    Other   Erythema: absent  Scars: present  Sensation: normal  Pulse: present  Swelling: mild  Effusion: no effusion present             Imagin views of the right knee were ordered and reviewed by myself in the office today  Indication: right knee replacement  Findings: X-rays demonstrate a cemented right total knee arthroplasty with an unresurfaced patella with implants in expected position no signs of loosening fracture, dislocation, subluxation, wear or subsidence.  No new acute osseous abnormality.  Comparative studies: 2-week postop " radiographs        Assessment:        1. S/P TKR (total knee replacement), right           Plan:          Discussed treatment options at length with patient at today's visit. I discussed with the patient that they are pretty behind from my standpoint.  The patient has achieved 0 to 90 degrees of flexion.  I discussed with them that it is important to continue working on strengthening and range of motion exercises.  They should continue to attend physical therapy until they are discharged by the therapist or until they feel like they are no longer making improvements.  I discussed with them that it is normal to have stiffness achiness and pain particularly at night and in the morning.  This will continue to improve as time goes on and may continue to improve for 6 to 12 months postoperatively. I offered the patient a narcotic refill.  The patient's surgical incision is healed without signs of infection and there are no signs of ligamentous instability.  It is now okay for the patient to soak or submerge the surgical incision underwater.  They should clean the incision daily with soapy water in the shower.  I would like the patient to notify our office immediately if they note any increasing redness, pain, fevers, chills, or drainage of any kind from their surgical incision as this would be abnormal at this point in time.  I offered the patient a narcotic refill.  They may discontinue the anticoagulant from my standpoint unless prescribed by another provider prior to surgery.  I would like to see the patient back in 2-3 weeks and at that point time if she has not achieved significantly better motion we may need to consider manipulation under anesthesia the patient voiced understanding and agreement with the plan.  Follow up: 2 to 3 weeks without x-rays      Trish Daley was in agreement with plan and had all questions answered.     Medications:  No orders of the defined types were placed in this  encounter.      Followup:  No follow-ups on file.    Diagnoses and all orders for this visit:    1. S/P TKR (total knee replacement), right (Primary)  -     XR Knee 1 or 2 View Right          Dictated utilizing Dragon dictation

## 2024-12-19 ENCOUNTER — OFFICE VISIT (OUTPATIENT)
Dept: ORTHOPEDIC SURGERY | Facility: CLINIC | Age: 59
End: 2024-12-19
Payer: COMMERCIAL

## 2024-12-19 VITALS — HEART RATE: 87 BPM | OXYGEN SATURATION: 98 % | WEIGHT: 216 LBS | BODY MASS INDEX: 38.27 KG/M2 | HEIGHT: 63 IN

## 2024-12-19 DIAGNOSIS — Z96.651 S/P TKR (TOTAL KNEE REPLACEMENT), RIGHT: Primary | ICD-10-CM

## 2024-12-19 PROCEDURE — 99024 POSTOP FOLLOW-UP VISIT: CPT | Performed by: INTERNAL MEDICINE

## 2024-12-19 NOTE — PROGRESS NOTES
Subjective:     Patient ID: Trish Daley is a 59 y.o. female.    Chief Complaint:    History of Present Illness  Trish Daley returns to clinic today for evaluation of right knee status post total knee arthroplasty 8 weeks ago on 10/24/2024.  The patient is doing much better.  She denies any fevers chills or drainage from her surgical incision.  She states that she has progressed well with physical therapy.  She is pleased with the result and is still working quite hard in therapy.  She is ambulatory today with no limp or assistive device.     Social History     Occupational History    Not on file   Tobacco Use    Smoking status: Never     Passive exposure: Past (father smoked as pt was growing up)    Smokeless tobacco: Never   Vaping Use    Vaping status: Never Used   Substance and Sexual Activity    Alcohol use: Yes     Alcohol/week: 1.0 standard drink of alcohol     Types: 1 Drinks containing 0.5 oz of alcohol per week     Comment: 1 glass of wine weekly    Drug use: Never    Sexual activity: Not Currently     Partners: Male     Birth control/protection: None, Hysterectomy     Comment: monogamous with       Past Medical History:   Diagnosis Date    Allergic     Dust, Mold, Bell Peppers, percocet    Ankle sprain     Arthritis     knees, hands, shoulder, neck    Arthritis of back     Arthritis of neck     Asthma     Bursitis of hip     Cancer     uterus    Cervical disc disorder     Depression     Fracture of wrist     Fracture, finger     Fracture, foot     Headache     weather    Knee sprain     Knee swelling     Periarthritis of shoulder     Rotator cuff syndrome     Tear of meniscus of knee     Thoracic disc disorder     Tremor     Hand jerks when trying to hold it still.  Have to brace it.    Wrist sprain      Past Surgical History:   Procedure Laterality Date    ENDOMETRIAL ABLATION      remove 2001    FRACTURE SURGERY  2004    left wrist    HERNIA REPAIR      HYSTERECTOMY  2001    JOINT  "REPLACEMENT  10/24/24    TOTAL KNEE ARTHROPLASTY Right 10/24/2024    Procedure: TOTAL KNEE ARTHROPLASTY WITH CORI ROBOT;  Surgeon: Garcia Huang MD;  Location: [facility] OR;  Service: Robotics - Ortho;  Laterality: Right;    WRIST SURGERY         Family History   Problem Relation Age of Onset    Hypertension Mother     COPD Mother     Osteoporosis Mother     Cancer Father     Hypertension Father     COPD Father     Arthritis Father     Vision loss Father     Hypertension Sister     Heart disease Sister     Scoliosis Sister     Hypertension Daughter     Hypertension Son     Asthma Paternal Aunt     Hypertension Son     Broken bones Sister     Dislocations Sister                  Objective:  Vitals:    12/19/24 1417   Pulse: 87   SpO2: 98%   Weight: 98 kg (216 lb)   Height: 160 cm (63\")         12/19/24  1417   Weight: 98 kg (216 lb)     Body mass index is 38.26 kg/m².           Right Knee Exam     Tenderness   The patient is experiencing no tenderness.     Range of Motion   Extension:  5   Flexion:  110     Tests   Varus: negative Valgus: negative  Lachman:  Anterior - negative    Posterior - negative  Drawer:  Anterior - negative    Posterior - negative    Other   Erythema: absent  Scars: present  Sensation: normal  Pulse: present  Swelling: mild  Effusion: no effusion present               Imaging: no new    Assessment:        1. S/P TKR (total knee replacement), right           Plan:          Discussed treatment options at length with patient at today's visit. I discussed with the patient that they are doing well from my standpoint.  The patient has achieved 0 to 110 degrees of flexion.  I discussed with them that it is important to continue working on strengthening and range of motion exercises.  They should continue to attend physical therapy until they are discharged by the therapist or until they feel like they are no longer making improvements.  I discussed with them that it is normal to have stiffness " achiness and pain particularly at night and in the morning.  This will continue to improve as time goes on and may continue to improve for 6 to 12 months postoperatively. I offered the patient a narcotic refill.  The patient's surgical incision is healed without signs of infection and there are no signs of ligamentous instability.  It is now okay for the patient to soak or submerge the surgical incision underwater.  They should clean the incision daily with soapy water in the shower.  I would like the patient to notify our office immediately if they note any increasing redness, pain, fevers, chills, or drainage of any kind from their surgical incision as this would be abnormal at this point in time.  I offered the patient a narcotic refill.  They may discontinue the anticoagulant from my standpoint unless prescribed by another provider prior to surgery.  I would like to see the patient back in 6 weeks for 12-week follow-up appointment.  The patient voiced understanding and agreement with the plan.   Follow up: 6 weeks with 2 views right knee      Trish BERMUDEZ Edi was in agreement with plan and had all questions answered.     Medications:  No orders of the defined types were placed in this encounter.      Followup:  No follow-ups on file.    Diagnoses and all orders for this visit:    1. S/P TKR (total knee replacement), right (Primary)          Dictated utilizing Dragon dictation

## 2025-01-31 ENCOUNTER — OFFICE VISIT (OUTPATIENT)
Dept: ORTHOPEDIC SURGERY | Facility: CLINIC | Age: 60
End: 2025-01-31
Payer: COMMERCIAL

## 2025-01-31 VITALS — WEIGHT: 216 LBS | HEIGHT: 63 IN | OXYGEN SATURATION: 97 % | HEART RATE: 75 BPM | BODY MASS INDEX: 38.27 KG/M2

## 2025-01-31 DIAGNOSIS — Z96.651 S/P TKR (TOTAL KNEE REPLACEMENT), RIGHT: Primary | ICD-10-CM

## 2025-01-31 NOTE — PROGRESS NOTES
Subjective:     Patient ID: Trish Daley is a 59 y.o. female.    Chief Complaint:    History of Present Illness  Trish Daley returns to clinic today for evaluation of right knee status post total knee arthroplasty performed by myself  3 months  ago.  She is no longer attending physical therapy and progressing well.  The patient denies any fevers chills or drainage from their surgical incision. She is happy with the result so far. She states that the pain and swelling are improving.  She is ambulatory today with no limp or assistive device       Social History     Occupational History   • Not on file   Tobacco Use   • Smoking status: Never     Passive exposure: Past (father smoked as pt was growing up)   • Smokeless tobacco: Never   Vaping Use   • Vaping status: Never Used   Substance and Sexual Activity   • Alcohol use: Yes     Alcohol/week: 1.0 standard drink of alcohol     Types: 1 Drinks containing 0.5 oz of alcohol per week     Comment: 1 glass of wine weekly   • Drug use: Never   • Sexual activity: Not Currently     Partners: Male     Birth control/protection: None, Hysterectomy     Comment: monogamous with       Past Medical History:   Diagnosis Date   • Allergic     Dust, Mold, Bell Peppers, percocet   • Ankle sprain    • Arthritis     knees, hands, shoulder, neck   • Arthritis of back    • Arthritis of neck    • Asthma    • Bursitis of hip    • Cancer     uterus   • Cervical disc disorder    • Depression    • Fracture of wrist    • Fracture, finger    • Fracture, foot    • Headache     weather   • Knee sprain    • Knee swelling    • Periarthritis of shoulder    • Rotator cuff syndrome    • Tear of meniscus of knee    • Thoracic disc disorder    • Tremor     Hand jerks when trying to hold it still.  Have to brace it.   • Wrist sprain      Past Surgical History:   Procedure Laterality Date   • ENDOMETRIAL ABLATION      remove 2001   • FRACTURE SURGERY  2004    left wrist   • HERNIA REPAIR     •  HYSTERECTOMY  2001   • JOINT REPLACEMENT  10/24/24   • TOTAL KNEE ARTHROPLASTY Right 10/24/2024    Procedure: TOTAL KNEE ARTHROPLASTY WITH CORI ROBOT;  Surgeon: Garcia Huang MD;  Location: [facility] OR;  Service: Robotics - Ortho;  Laterality: Right;   • WRIST SURGERY         Family History   Problem Relation Age of Onset   • Hypertension Mother    • COPD Mother    • Osteoporosis Mother    • Cancer Father    • Hypertension Father    • COPD Father    • Arthritis Father    • Vision loss Father    • Hypertension Sister    • Heart disease Sister    • Scoliosis Sister    • Hypertension Daughter    • Hypertension Son    • Asthma Paternal Aunt    • Hypertension Son    • Broken bones Sister    • Dislocations Sister                  Objective:  There were no vitals filed for this visit.  There were no vitals filed for this visit.  There is no height or weight on file to calculate BMI.           Right Knee Exam     Tenderness   The patient is experiencing no tenderness.     Range of Motion   Extension:  0   Right knee flexion: 115.     Tests   Varus: negative Valgus: negative  Lachman:  Anterior - negative    Posterior - negative  Drawer:  Anterior - negative    Posterior - negative    Other   Erythema: absent  Scars: present  Sensation: normal  Pulse: present  Swelling: mild  Effusion: no effusion present             Imagin views of the right knee were ordered and reviewed by myself in the office today  Indication: right knee replacement  Findings: X-rays demonstrate a  hybrid  right total knee arthroplasty with an unresurfaced patella with implants in expected position no signs of loosening fracture, dislocation, subluxation, wear or subsidence.  No new acute osseous abnormality.  Comparative studies:  6 week post op  radiographs      Assessment:        1. S/P TKR (total knee replacement), right           Plan:          Discussed treatment options at length with patient at today's visit. I discussed with the  patient that they are doing well from my standpoint.  I am happy with the progress that they have made and they have achieved 0 to 120 degrees.  I would like them to continue to work on range of motion and strengthening exercises.  I discussed with them that it is normal to have stiffness achiness and pain particularly at night and in the morning.  This will continue to improve as time goes on and may continue to improve for 6 to 12 months postoperatively.   The patient's surgical incision is healed without signs of infection and there are no signs of ligamentous instability.  I would like the patient to notify our office immediately if they note any increasing redness, pain, fevers, chills, or drainage of any kind from their surgical incision as this would be abnormal at this point in time.  I discussed with the patient that at this point in time we do not need to see them back for 9 months for a 1 year follow-up appointment.  I did discuss however that I am happy to see the patient sooner if issues questions or concerns arise.  The patient voiced understanding and agreement with the plan.   Follow up: October 2025 with 3 views of the right knee      Trish AIDAN Daley was in agreement with plan and had all questions answered.     Medications:  No orders of the defined types were placed in this encounter.      Followup:  No follow-ups on file.    Diagnoses and all orders for this visit:    1. S/P TKR (total knee replacement), right (Primary)          Dictated utilizing Dragon dictation

## 2025-02-13 NOTE — PROGRESS NOTES
Chief Complaint  Chief Complaint   Patient presents with    Annual Exam       Subjective    History of Present Illness        Trish Daley presents to [facility] for   Trish Daley is a 59 y.o. female here for her annual physical with me. Trish is here for coordination of medical care, to discuss health maintenance, disease prevention as well as to followup on medical problems.     Annual Physical Exam  Patient's last Physical Exam was 02/03/2024. Patient's medical history, medications and allergy lists were reviewed and updated.  Activity level is moderate.   Exercises 5 per week.   Appetite is fair.   Feels well with few complaints.   Energy level is good.   Sleeps fairly well.   Patient's last colonoscopy was 07/26/2023. She is advised to repeat in 10 years.   Patient's last mammogram was 04/27/2022.   Patient is doing routine self skin exam occasionally.   Patient is doing routine self-breast exams monthly  Patient's menstrual cycles are:   Post menopausal  Last pap smear was done:  Hysterectomy  -Patient states her hysterectomy was done for precancerous reasons, she stopped seeing her OB/GYN in 2002  -Offered Influenza, COVID, and Prevnar 20 vaccines, but pt declines  -Patient scored positive on anxiety and depression but she states she has a lot going on at work and this is the cause.  She is not interested in starting a medication      Family History   Problem Relation Age of Onset    Hypertension Mother     COPD Mother     Osteoporosis Mother     Cancer Father         lung, kidney and bladder    Hypertension Father     COPD Father         smoker    Arthritis Father     Vision loss Father     Hypertension Sister     Heart disease Sister     Scoliosis Sister     Dislocations Sister     Heart attack Sister     Hypertension Daughter     Hypertension Son     Asthma Paternal Aunt        Social History     Tobacco Use    Smoking status: Never     Passive exposure: Past (father  "smoked as pt was growing up)    Smokeless tobacco: Never   Vaping Use    Vaping status: Never Used   Substance Use Topics    Alcohol use: Yes     Alcohol/week: 1.0 standard drink of alcohol     Types: 1 Drinks containing 0.5 oz of alcohol per week     Comment: 1 glass of wine weekly    Drug use: Never       Past Surgical History:   Procedure Laterality Date    COLONOSCOPY  07/26/2023    repeat 10 years    ENDOMETRIAL ABLATION  2001    HYSTERECTOMY  2001    done for precancerous cells    TOTAL KNEE ARTHROPLASTY Right 10/24/2024    Procedure: TOTAL KNEE ARTHROPLASTY WITH CORI ROBOT;  Surgeon: Garcia Huang MD;  Location: [facility] OR;  Service: Robotics - Ortho;  Laterality: Right;    WRIST SURGERY Left 2003       Patient Active Problem List   Diagnosis    Class 2 obesity due to excess calories without serious comorbidity with body mass index (BMI) of 37.0 to 37.9 in adult    Mild intermittent asthma without complication    Primary osteoarthritis of right knee    Mixed hyperlipidemia    Vitamin D deficiency    Prediabetes         Current Outpatient Medications:     albuterol sulfate  (90 Base) MCG/ACT inhaler, Inhale 2 puffs Every 6 (Six) Hours As Needed for Wheezing., Disp: 18 g, Rfl: 3    diphenhydrAMINE-acetaminophen (TYLENOL PM)  MG tablet per tablet, Take 1 tablet by mouth At Night As Needed for Sleep., Disp: , Rfl:     Turmeric (QC TUMERIC COMPLEX PO), Take  by mouth., Disp: , Rfl:     vitamin D3 125 MCG (5000 UT) capsule capsule, , Disp: , Rfl:     Objective   /66 (BP Location: Left arm, Patient Position: Sitting, Cuff Size: Large Adult)   Pulse 102   Temp 97.8 °F (36.6 °C) (Skin)   Resp 16   Ht 160 cm (63\")   Wt 95.3 kg (210 lb)   SpO2 97%   BMI 37.20 kg/m²   BP Readings from Last 3 Encounters:   02/14/25 120/66   10/24/24 112/72   10/16/24 161/88     Wt Readings from Last 3 Encounters:   02/14/25 95.3 kg (210 lb)   01/31/25 98 kg (216 lb)   12/19/24 98 kg (216 lb)     Physical " Exam  Constitutional:       General: She is not in acute distress.  HENT:      Head: Normocephalic and atraumatic.      Right Ear: Tympanic membrane normal.      Left Ear: Tympanic membrane normal.      Nose: Nose normal.      Mouth/Throat:      Mouth: Mucous membranes are moist.      Pharynx: Oropharynx is clear. No posterior oropharyngeal erythema.   Eyes:      Extraocular Movements: Extraocular movements intact.      Conjunctiva/sclera: Conjunctivae normal.   Neck:      Comments: - Thyroid not enlarged  Cardiovascular:      Rate and Rhythm: Normal rate and regular rhythm.      Heart sounds: Normal heart sounds.   Pulmonary:      Effort: Pulmonary effort is normal.      Breath sounds: Normal breath sounds. No stridor. No wheezing.   Abdominal:      General: Abdomen is flat. Bowel sounds are normal.      Palpations: Abdomen is soft. There is no mass.      Tenderness: There is no abdominal tenderness. There is no guarding.   Musculoskeletal:         General: No swelling or deformity. Normal range of motion.      Cervical back: Normal range of motion and neck supple.   Skin:     General: Skin is warm and dry.      Capillary Refill: Capillary refill takes less than 2 seconds.      Coloration: Skin is not jaundiced.      Findings: No rash.   Neurological:      General: No focal deficit present.      Mental Status: She is alert and oriented to person, place, and time.      Cranial Nerves: No cranial nerve deficit.      Motor: No weakness.      Coordination: Coordination normal.      Gait: Gait normal.      Deep Tendon Reflexes: Reflexes normal.   Psychiatric:         Mood and Affect: Mood normal.         Behavior: Behavior normal.         Thought Content: Thought content normal.             Assessment and Plan   Diagnoses and all orders for this visit:    1. Annual physical exam (Primary)  -Overall normal 59-year-old female exam  -Pertinent screening labs ordered per below  -Patient due for mammogram, ordered below   -  patient is up-to-date on colon cancer screening  -Patient had a hysterectomy due to precancerous cells.  Discussed with patient that the guidelines for this would be to do a Pap smear every 3 years until we are 25 years out from her surgery would like to do 1 Pap smear of the vaginal cuff to ensure that she has normal cells.  Patient agreeable but would like to do it on a different day    3. Prediabetes  -     Hemoglobin A1c    4. Vitamin D deficiency  -     Vitamin D,25-Hydroxy    5. Mixed hyperlipidemia  -     Lipid panel    6. Abnormal laboratory test result  -     CBC & Differential  -     Comprehensive metabolic panel    7. Thyroid disorder screen  -     TSH Rfx On Abnormal To Free T4    8. Screening mammogram for breast cancer  -     Mammo Screening Digital Tomosynthesis Bilateral With CAD    9. Class 2 severe obesity due to excess calories with serious comorbidity and body mass index (BMI) of 37.0 to 37.9 in adult  Class 2 Severe Obesity (BMI >=35 and <=39.9). Obesity-related health conditions include the following:  asthma . Obesity is unchanged. BMI is is above average; BMI management plan is completed. We discussed portion control and increasing exercise.         Follow Up   - 1 year for annual physical exam  - 2-3 weeks pap smear      The patient was counseled regarding nutrition, physical activity, healthy weight, injury prevention, immunizations and preventative health screenings. Expected course, medications, and adverse effects discussed.  Call or return if worsening or persistent symptoms.  I wore protective equipment throughout this patient encounter including a mask and eye protection.   The complete contents of the Assessment and Plan and Data / Lab Results as documented above have been reviewed and addressed by myself with the patient today as part of an ongoing evaluation / treatment plan.

## 2025-02-14 ENCOUNTER — OFFICE VISIT (OUTPATIENT)
Dept: FAMILY MEDICINE CLINIC | Facility: CLINIC | Age: 60
End: 2025-02-14
Payer: COMMERCIAL

## 2025-02-14 VITALS
HEART RATE: 102 BPM | SYSTOLIC BLOOD PRESSURE: 120 MMHG | OXYGEN SATURATION: 97 % | HEIGHT: 63 IN | RESPIRATION RATE: 16 BRPM | WEIGHT: 210 LBS | BODY MASS INDEX: 37.21 KG/M2 | TEMPERATURE: 97.8 F | DIASTOLIC BLOOD PRESSURE: 66 MMHG

## 2025-02-14 DIAGNOSIS — R73.03 PREDIABETES: ICD-10-CM

## 2025-02-14 DIAGNOSIS — E66.01 CLASS 2 SEVERE OBESITY DUE TO EXCESS CALORIES WITH SERIOUS COMORBIDITY AND BODY MASS INDEX (BMI) OF 37.0 TO 37.9 IN ADULT: ICD-10-CM

## 2025-02-14 DIAGNOSIS — Z13.29 THYROID DISORDER SCREEN: ICD-10-CM

## 2025-02-14 DIAGNOSIS — Z12.31 SCREENING MAMMOGRAM FOR BREAST CANCER: ICD-10-CM

## 2025-02-14 DIAGNOSIS — E55.9 VITAMIN D DEFICIENCY: ICD-10-CM

## 2025-02-14 DIAGNOSIS — E66.812 CLASS 2 SEVERE OBESITY DUE TO EXCESS CALORIES WITH SERIOUS COMORBIDITY AND BODY MASS INDEX (BMI) OF 37.0 TO 37.9 IN ADULT: ICD-10-CM

## 2025-02-14 DIAGNOSIS — E78.2 MIXED HYPERLIPIDEMIA: ICD-10-CM

## 2025-02-14 DIAGNOSIS — R89.9 ABNORMAL LABORATORY TEST RESULT: ICD-10-CM

## 2025-02-14 DIAGNOSIS — Z00.00 ANNUAL PHYSICAL EXAM: Primary | ICD-10-CM

## 2025-03-13 ENCOUNTER — TELEPHONE (OUTPATIENT)
Dept: FAMILY MEDICINE CLINIC | Facility: CLINIC | Age: 60
End: 2025-03-13
Payer: COMMERCIAL

## 2025-03-13 NOTE — TELEPHONE ENCOUNTER
Caller: Trish Daley    Relationship: Self    Best call back number: [phone]    What medication are you requesting: STEROID TO HELP POISON IVY ITCHING     What are your current symptoms: PATIENT STATES THAT SHE NOTICED BUBBLES/BUMPS AND BELIEVES THAT THIS IS POISON IVY AND IT'S SPREADING ON FOREARM AND RED BLOTCHES ARE GOING UP ARM. IT'S ALSO ON NECK AND BACK.     How long have you been experiencing symptoms: 03/13    Have you had these symptoms before:    [x] Yes  [] No    Have you been treated for these symptoms before:   [x] Yes  [] No    If a prescription is needed, what is your preferred pharmacy and phone number:    [id number] - [address] & [address] [facility] [address] - [phone]  - [phone]  [phone]       Additional notes: PLEASE ADVISE ASAP.

## 2025-03-14 NOTE — TELEPHONE ENCOUNTER
Let's actually see her.  It is a little early in the year for plants to start coming out and I am concerned it may not be poison ivy but possibly a shingles rash.  We also needed to see her at the end of February for a Pap smear.    Could we force her in at 4:30 with me today?

## 2025-06-03 ENCOUNTER — TELEPHONE (OUTPATIENT)
Dept: FAMILY MEDICINE CLINIC | Facility: CLINIC | Age: 60
End: 2025-06-03
Payer: COMMERCIAL

## 2025-06-03 NOTE — TELEPHONE ENCOUNTER
ROSITA 06/03/2025, 3:55 pm requested pt return my call.     We have received a fax from [email or web address] requesting medical records for [employer]      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

## 2025-06-06 ENCOUNTER — TELEPHONE (OUTPATIENT)
Dept: FAMILY MEDICINE CLINIC | Facility: CLINIC | Age: 60
End: 2025-06-06
Payer: COMMERCIAL

## 2025-06-06 NOTE — TELEPHONE ENCOUNTER
Pt returned my call 06/06/2025, 08:14 am advised pt we had received paperwork requesting medical for [employer], confirming this is ok to fax medical records.    Pt confirmed permission to fax records

## 2025-07-24 ENCOUNTER — TELEPHONE (OUTPATIENT)
Dept: FAMILY MEDICINE CLINIC | Facility: CLINIC | Age: 60
End: 2025-07-24
Payer: COMMERCIAL

## 2025-07-24 NOTE — TELEPHONE ENCOUNTER
Caller: BASILIO DUMONT ON  [address]    Relationship: Emergency Contact    Best call back number: [phone]       Who are you requesting to speak with (clinical staff, provider,  specific staff member):       What was the call regarding: PATIENT IS ABOUT TO HAVE FIRST GRANDCHILD, AND WANTED TO KNOW IF SHE CAN GET THE WHOOPING COUGH VACCINE IN THE OFFICE AND IF NOT WHERE CAN SHE GET THIS?

## (undated) DEVICE — COVER,MAYO STAND,STERILE: Brand: MEDLINE

## (undated) DEVICE — 3M™ IOBAN™ 2 ANTIMICROBIAL INCISE DRAPE 6651EZ: Brand: IOBAN™ 2

## (undated) DEVICE — SOL IRRIG SOD CHL 0.9PCT 3000ML

## (undated) DEVICE — UNDERGLV SURG BIOGEL INDICAT PI SZ8.5 BLU

## (undated) DEVICE — UNDRGLV SURG BIOGEL PUNCTUREINDICATION SZ7 PF STRL

## (undated) DEVICE — DUAL CUT SAGITTAL BLADE

## (undated) DEVICE — SOL IRRIG NACL 1000ML

## (undated) DEVICE — CEMENT MIXING SYSTEM WITH FEMORAL BREAKWAY NOZZLE: Brand: REVOLUTION

## (undated) DEVICE — SOL IRR NACL 0.9PCT ARTHROMATIC 3000ML

## (undated) DEVICE — SUT ETHIB 2 CV V37 MS/4 30IN MX69G

## (undated) DEVICE — GLOVE,SURG,SENSICARE SLT,LF,PF,7.5: Brand: MEDLINE

## (undated) DEVICE — MARKER SKIN W/RULER DUAL: Brand: MEDLINE INDUSTRIES, INC.

## (undated) DEVICE — WRAP KNEE COLD THERAPY

## (undated) DEVICE — HANDPIECE SET WITH COAXIAL MULTI-ORIFICE TIP AND SUCTION TUBE: Brand: INTERPULSE

## (undated) DEVICE — SYR LUERLOK 30CC

## (undated) DEVICE — DISPOSABLE TOURNIQUET CUFF SINGLE BLADDER, SINGLE PORT AND QUICK CONNECT CONNECTOR: Brand: COLOR CUFF

## (undated) DEVICE — APPL CHLORAPREP HI/LITE 26ML ORNG

## (undated) DEVICE — KT SURG TURNOVER 050

## (undated) DEVICE — SYR LUERLOK 20CC BX/50

## (undated) DEVICE — FOAM BUMP, LARGE: Brand: MEDLINE INDUSTRIES, INC.

## (undated) DEVICE — SUT MNCRYL 2/0 CT1 36IN UD MCP945H

## (undated) DEVICE — GLV SURG SENSICARE PI LF PF 8 GRN STRL

## (undated) DEVICE — IRRIGATOR BULB ASEPTO 60CC STRL

## (undated) DEVICE — Device

## (undated) DEVICE — PICO 7 10CM X 30CM: Brand: PICO™ 7

## (undated) DEVICE — NEEDLE, QUINCKE, 20GX3.5": Brand: MEDLINE

## (undated) DEVICE — SOL IRRIG H2O 1000ML STRL

## (undated) DEVICE — ADHS LIQ MASTISOL 2/3ML

## (undated) DEVICE — ADHS SKIN PREMIERPRO EXOFIN TOPICAL HI/VISC .5ML

## (undated) DEVICE — SOL ISO/ALC RUB 70PCT 4OZ

## (undated) DEVICE — PK TOTL KN 50